# Patient Record
Sex: MALE | Race: WHITE | Employment: OTHER | URBAN - METROPOLITAN AREA
[De-identification: names, ages, dates, MRNs, and addresses within clinical notes are randomized per-mention and may not be internally consistent; named-entity substitution may affect disease eponyms.]

---

## 2019-04-17 ENCOUNTER — HOSPITAL ENCOUNTER (OUTPATIENT)
Age: 84
Discharge: HOME | End: 2019-04-19
Payer: MEDICARE

## 2019-04-17 DIAGNOSIS — E03.9 ACQUIRED HYPOTHYROIDISM: ICD-10-CM

## 2019-04-17 DIAGNOSIS — E11.9 TYPE 2 DIABETES MELLITUS WITHOUT COMPLICATION, WITHOUT LONG-TERM CURRENT USE OF INSULIN (HCC): ICD-10-CM

## 2019-04-17 PROBLEM — N40.0 BENIGN PROSTATIC HYPERPLASIA WITHOUT LOWER URINARY TRACT SYMPTOMS: Status: ACTIVE | Noted: 2019-04-17

## 2019-04-17 PROBLEM — E78.00 PURE HYPERCHOLESTEROLEMIA: Status: ACTIVE | Noted: 2019-04-17

## 2019-04-17 LAB
ALBUMIN SERPL-MCNC: 4.1 G/DL (ref 3.5–5.2)
ALP BLD-CCNC: 65 U/L (ref 40–129)
ALT SERPL-CCNC: 6 U/L (ref 0–40)
ANION GAP SERPL CALCULATED.3IONS-SCNC: 13 MMOL/L (ref 7–16)
AST SERPL-CCNC: 18 U/L (ref 0–39)
BASOPHILS ABSOLUTE: 0.08 E9/L (ref 0–0.2)
BASOPHILS RELATIVE PERCENT: 1.1 % (ref 0–2)
BILIRUB SERPL-MCNC: 0.6 MG/DL (ref 0–1.2)
BUN BLDV-MCNC: 15 MG/DL (ref 8–23)
CALCIUM SERPL-MCNC: 9.6 MG/DL (ref 8.6–10.2)
CHLORIDE BLD-SCNC: 106 MMOL/L (ref 98–107)
CHOLESTEROL, TOTAL: 128 MG/DL (ref 0–199)
CO2: 23 MMOL/L (ref 22–29)
CREAT SERPL-MCNC: 1.2 MG/DL (ref 0.7–1.2)
EOSINOPHILS ABSOLUTE: 0.21 E9/L (ref 0.05–0.5)
EOSINOPHILS RELATIVE PERCENT: 3 % (ref 0–6)
GFR AFRICAN AMERICAN: >60
GFR NON-AFRICAN AMERICAN: 58 ML/MIN/1.73
GLUCOSE BLD-MCNC: 114 MG/DL (ref 74–99)
HBA1C MFR BLD: 6.3 % (ref 4–5.6)
HCT VFR BLD CALC: 42.6 % (ref 37–54)
HDLC SERPL-MCNC: 44 MG/DL
HEMOGLOBIN: 13.8 G/DL (ref 12.5–16.5)
IMMATURE GRANULOCYTES #: 0.03 E9/L
IMMATURE GRANULOCYTES %: 0.4 % (ref 0–5)
LDL CHOLESTEROL CALCULATED: 68 MG/DL (ref 0–99)
LYMPHOCYTES ABSOLUTE: 2.54 E9/L (ref 1.5–4)
LYMPHOCYTES RELATIVE PERCENT: 35.8 % (ref 20–42)
MCH RBC QN AUTO: 29.6 PG (ref 26–35)
MCHC RBC AUTO-ENTMCNC: 32.4 % (ref 32–34.5)
MCV RBC AUTO: 91.4 FL (ref 80–99.9)
MONOCYTES ABSOLUTE: 0.57 E9/L (ref 0.1–0.95)
MONOCYTES RELATIVE PERCENT: 8 % (ref 2–12)
NEUTROPHILS ABSOLUTE: 3.67 E9/L (ref 1.8–7.3)
NEUTROPHILS RELATIVE PERCENT: 51.7 % (ref 43–80)
PDW BLD-RTO: 13.1 FL (ref 11.5–15)
PLATELET # BLD: 197 E9/L (ref 130–450)
PMV BLD AUTO: 11.1 FL (ref 7–12)
POTASSIUM SERPL-SCNC: 4.3 MMOL/L (ref 3.5–5)
RBC # BLD: 4.66 E12/L (ref 3.8–5.8)
SODIUM BLD-SCNC: 142 MMOL/L (ref 132–146)
TOTAL PROTEIN: 7 G/DL (ref 6.4–8.3)
TRIGL SERPL-MCNC: 79 MG/DL (ref 0–149)
TSH SERPL DL<=0.05 MIU/L-ACNC: 3.07 UIU/ML (ref 0.27–4.2)
VLDLC SERPL CALC-MCNC: 16 MG/DL
WBC # BLD: 7.1 E9/L (ref 4.5–11.5)

## 2019-04-17 PROCEDURE — 84443 ASSAY THYROID STIM HORMONE: CPT

## 2019-04-17 PROCEDURE — 80061 LIPID PANEL: CPT

## 2019-04-17 PROCEDURE — 80053 COMPREHEN METABOLIC PANEL: CPT

## 2019-04-17 PROCEDURE — 85025 COMPLETE CBC W/AUTO DIFF WBC: CPT

## 2019-04-17 PROCEDURE — 83036 HEMOGLOBIN GLYCOSYLATED A1C: CPT

## 2019-07-17 ENCOUNTER — HOSPITAL ENCOUNTER (OUTPATIENT)
Age: 84
Discharge: HOME | End: 2019-07-19
Payer: MEDICARE

## 2019-07-17 DIAGNOSIS — E11.9 TYPE 2 DIABETES MELLITUS WITHOUT COMPLICATION, WITHOUT LONG-TERM CURRENT USE OF INSULIN (HCC): ICD-10-CM

## 2019-07-17 DIAGNOSIS — E03.9 ACQUIRED HYPOTHYROIDISM: ICD-10-CM

## 2019-07-17 LAB
BASOPHILS ABSOLUTE: 0.05 E9/L (ref 0–0.2)
BASOPHILS RELATIVE PERCENT: 0.7 % (ref 0–2)
EOSINOPHILS ABSOLUTE: 0.11 E9/L (ref 0.05–0.5)
EOSINOPHILS RELATIVE PERCENT: 1.6 % (ref 0–6)
HCT VFR BLD CALC: 42.8 % (ref 37–54)
HEMOGLOBIN: 13.7 G/DL (ref 12.5–16.5)
IMMATURE GRANULOCYTES #: 0.02 E9/L
IMMATURE GRANULOCYTES %: 0.3 % (ref 0–5)
LYMPHOCYTES ABSOLUTE: 2.2 E9/L (ref 1.5–4)
LYMPHOCYTES RELATIVE PERCENT: 33 % (ref 20–42)
MCH RBC QN AUTO: 29.5 PG (ref 26–35)
MCHC RBC AUTO-ENTMCNC: 32 % (ref 32–34.5)
MCV RBC AUTO: 92.2 FL (ref 80–99.9)
MONOCYTES ABSOLUTE: 0.52 E9/L (ref 0.1–0.95)
MONOCYTES RELATIVE PERCENT: 7.8 % (ref 2–12)
NEUTROPHILS ABSOLUTE: 3.77 E9/L (ref 1.8–7.3)
NEUTROPHILS RELATIVE PERCENT: 56.6 % (ref 43–80)
PDW BLD-RTO: 13.3 FL (ref 11.5–15)
PLATELET # BLD: 163 E9/L (ref 130–450)
PMV BLD AUTO: 11.4 FL (ref 7–12)
RBC # BLD: 4.64 E12/L (ref 3.8–5.8)
WBC # BLD: 6.7 E9/L (ref 4.5–11.5)

## 2019-07-17 PROCEDURE — 82570 ASSAY OF URINE CREATININE: CPT

## 2019-07-17 PROCEDURE — 80061 LIPID PANEL: CPT

## 2019-07-17 PROCEDURE — 83036 HEMOGLOBIN GLYCOSYLATED A1C: CPT

## 2019-07-17 PROCEDURE — 82044 UR ALBUMIN SEMIQUANTITATIVE: CPT

## 2019-07-17 PROCEDURE — 80053 COMPREHEN METABOLIC PANEL: CPT

## 2019-07-17 PROCEDURE — 84443 ASSAY THYROID STIM HORMONE: CPT

## 2019-07-17 PROCEDURE — 85025 COMPLETE CBC W/AUTO DIFF WBC: CPT

## 2019-07-18 LAB
ALBUMIN SERPL-MCNC: 4.1 G/DL (ref 3.5–5.2)
ALP BLD-CCNC: 65 U/L (ref 40–129)
ALT SERPL-CCNC: 15 U/L (ref 0–40)
ANION GAP SERPL CALCULATED.3IONS-SCNC: 13 MMOL/L (ref 7–16)
AST SERPL-CCNC: 17 U/L (ref 0–39)
BILIRUB SERPL-MCNC: 0.5 MG/DL (ref 0–1.2)
BUN BLDV-MCNC: 15 MG/DL (ref 8–23)
CALCIUM SERPL-MCNC: 9.3 MG/DL (ref 8.6–10.2)
CHLORIDE BLD-SCNC: 106 MMOL/L (ref 98–107)
CHOLESTEROL, TOTAL: 132 MG/DL (ref 0–199)
CO2: 22 MMOL/L (ref 22–29)
CREAT SERPL-MCNC: 1.3 MG/DL (ref 0.7–1.2)
CREATININE URINE: 119 MG/DL (ref 40–278)
GFR AFRICAN AMERICAN: >60
GFR NON-AFRICAN AMERICAN: 52 ML/MIN/1.73
GLUCOSE BLD-MCNC: 122 MG/DL (ref 74–99)
HBA1C MFR BLD: 6.3 % (ref 4–5.6)
HDLC SERPL-MCNC: 46 MG/DL
LDL CHOLESTEROL CALCULATED: 71 MG/DL (ref 0–99)
MICROALBUMIN UR-MCNC: 84.6 MG/L
MICROALBUMIN/CREAT UR-RTO: 71.1 (ref 0–30)
POTASSIUM SERPL-SCNC: 3.8 MMOL/L (ref 3.5–5)
SODIUM BLD-SCNC: 141 MMOL/L (ref 132–146)
TOTAL PROTEIN: 7.1 G/DL (ref 6.4–8.3)
TRIGL SERPL-MCNC: 75 MG/DL (ref 0–149)
TSH SERPL DL<=0.05 MIU/L-ACNC: 3.08 UIU/ML (ref 0.27–4.2)
VLDLC SERPL CALC-MCNC: 15 MG/DL

## 2019-10-23 ENCOUNTER — HOSPITAL ENCOUNTER (OUTPATIENT)
Age: 84
Discharge: HOME | End: 2019-10-25
Payer: MEDICARE

## 2019-10-23 DIAGNOSIS — E03.9 ACQUIRED HYPOTHYROIDISM: ICD-10-CM

## 2019-10-23 DIAGNOSIS — E11.9 TYPE 2 DIABETES MELLITUS WITHOUT COMPLICATION, WITHOUT LONG-TERM CURRENT USE OF INSULIN (HCC): ICD-10-CM

## 2019-10-23 LAB
BASOPHILS ABSOLUTE: 0.08 E9/L (ref 0–0.2)
BASOPHILS RELATIVE PERCENT: 1.1 % (ref 0–2)
EOSINOPHILS ABSOLUTE: 0.2 E9/L (ref 0.05–0.5)
EOSINOPHILS RELATIVE PERCENT: 2.8 % (ref 0–6)
HCT VFR BLD CALC: 45.3 % (ref 37–54)
HEMOGLOBIN: 14.6 G/DL (ref 12.5–16.5)
IMMATURE GRANULOCYTES #: 0.02 E9/L
IMMATURE GRANULOCYTES %: 0.3 % (ref 0–5)
LYMPHOCYTES ABSOLUTE: 2.6 E9/L (ref 1.5–4)
LYMPHOCYTES RELATIVE PERCENT: 36.7 % (ref 20–42)
MCH RBC QN AUTO: 29.9 PG (ref 26–35)
MCHC RBC AUTO-ENTMCNC: 32.2 % (ref 32–34.5)
MCV RBC AUTO: 92.8 FL (ref 80–99.9)
MONOCYTES ABSOLUTE: 0.51 E9/L (ref 0.1–0.95)
MONOCYTES RELATIVE PERCENT: 7.2 % (ref 2–12)
NEUTROPHILS ABSOLUTE: 3.68 E9/L (ref 1.8–7.3)
NEUTROPHILS RELATIVE PERCENT: 51.9 % (ref 43–80)
PDW BLD-RTO: 13.7 FL (ref 11.5–15)
PLATELET # BLD: 193 E9/L (ref 130–450)
PMV BLD AUTO: 11.1 FL (ref 7–12)
RBC # BLD: 4.88 E12/L (ref 3.8–5.8)
WBC # BLD: 7.1 E9/L (ref 4.5–11.5)

## 2019-10-23 PROCEDURE — 80053 COMPREHEN METABOLIC PANEL: CPT

## 2019-10-23 PROCEDURE — 80061 LIPID PANEL: CPT

## 2019-10-23 PROCEDURE — 85025 COMPLETE CBC W/AUTO DIFF WBC: CPT

## 2019-10-23 PROCEDURE — 82570 ASSAY OF URINE CREATININE: CPT

## 2019-10-23 PROCEDURE — 82044 UR ALBUMIN SEMIQUANTITATIVE: CPT

## 2019-10-23 PROCEDURE — 83036 HEMOGLOBIN GLYCOSYLATED A1C: CPT

## 2019-10-23 PROCEDURE — 84443 ASSAY THYROID STIM HORMONE: CPT

## 2019-10-24 LAB
ALBUMIN SERPL-MCNC: 4.3 G/DL (ref 3.5–5.2)
ALP BLD-CCNC: 59 U/L (ref 40–129)
ALT SERPL-CCNC: 13 U/L (ref 0–40)
ANION GAP SERPL CALCULATED.3IONS-SCNC: 15 MMOL/L (ref 7–16)
AST SERPL-CCNC: 22 U/L (ref 0–39)
BILIRUB SERPL-MCNC: 0.6 MG/DL (ref 0–1.2)
BUN BLDV-MCNC: 15 MG/DL (ref 8–23)
CALCIUM SERPL-MCNC: 9.2 MG/DL (ref 8.6–10.2)
CHLORIDE BLD-SCNC: 106 MMOL/L (ref 98–107)
CHOLESTEROL, TOTAL: 146 MG/DL (ref 0–199)
CO2: 21 MMOL/L (ref 22–29)
CREAT SERPL-MCNC: 1.5 MG/DL (ref 0.7–1.2)
CREATININE URINE: 104 MG/DL (ref 40–278)
GFR AFRICAN AMERICAN: 54
GFR NON-AFRICAN AMERICAN: 44 ML/MIN/1.73
GLUCOSE BLD-MCNC: 116 MG/DL (ref 74–99)
HBA1C MFR BLD: 6.4 % (ref 4–5.6)
HDLC SERPL-MCNC: 47 MG/DL
LDL CHOLESTEROL CALCULATED: 83 MG/DL (ref 0–99)
MICROALBUMIN UR-MCNC: 58.7 MG/L
MICROALBUMIN/CREAT UR-RTO: 56.4 (ref 0–30)
POTASSIUM SERPL-SCNC: 4.4 MMOL/L (ref 3.5–5)
SODIUM BLD-SCNC: 142 MMOL/L (ref 132–146)
TOTAL PROTEIN: 7.3 G/DL (ref 6.4–8.3)
TRIGL SERPL-MCNC: 79 MG/DL (ref 0–149)
TSH SERPL DL<=0.05 MIU/L-ACNC: 5.7 UIU/ML (ref 0.27–4.2)
VLDLC SERPL CALC-MCNC: 16 MG/DL

## 2020-06-08 ENCOUNTER — HOSPITAL ENCOUNTER (OUTPATIENT)
Age: 85
Discharge: HOME | End: 2020-06-10
Payer: MEDICARE

## 2020-06-08 PROCEDURE — 80061 LIPID PANEL: CPT

## 2020-06-08 PROCEDURE — 82044 UR ALBUMIN SEMIQUANTITATIVE: CPT

## 2020-06-08 PROCEDURE — 82570 ASSAY OF URINE CREATININE: CPT

## 2020-06-08 PROCEDURE — 83036 HEMOGLOBIN GLYCOSYLATED A1C: CPT

## 2020-06-08 PROCEDURE — 80053 COMPREHEN METABOLIC PANEL: CPT

## 2020-06-08 PROCEDURE — 85025 COMPLETE CBC W/AUTO DIFF WBC: CPT

## 2020-06-09 LAB
ALBUMIN SERPL-MCNC: 4.3 G/DL (ref 3.5–5.2)
ALP BLD-CCNC: 54 U/L (ref 40–129)
ALT SERPL-CCNC: 19 U/L (ref 0–40)
ANION GAP SERPL CALCULATED.3IONS-SCNC: 13 MMOL/L (ref 7–16)
AST SERPL-CCNC: 25 U/L (ref 0–39)
BASOPHILS ABSOLUTE: 0.06 E9/L (ref 0–0.2)
BASOPHILS RELATIVE PERCENT: 0.8 % (ref 0–2)
BILIRUB SERPL-MCNC: 0.4 MG/DL (ref 0–1.2)
BUN BLDV-MCNC: 18 MG/DL (ref 8–23)
CALCIUM SERPL-MCNC: 9.7 MG/DL (ref 8.6–10.2)
CHLORIDE BLD-SCNC: 104 MMOL/L (ref 98–107)
CHOLESTEROL, TOTAL: 130 MG/DL (ref 0–199)
CO2: 22 MMOL/L (ref 22–29)
CREAT SERPL-MCNC: 1.5 MG/DL (ref 0.7–1.2)
CREATININE URINE: 103 MG/DL (ref 40–278)
EOSINOPHILS ABSOLUTE: 0.08 E9/L (ref 0.05–0.5)
EOSINOPHILS RELATIVE PERCENT: 1.1 % (ref 0–6)
GFR AFRICAN AMERICAN: 54
GFR NON-AFRICAN AMERICAN: 44 ML/MIN/1.73
GLUCOSE BLD-MCNC: 97 MG/DL (ref 74–99)
HBA1C MFR BLD: 6.5 % (ref 4–5.6)
HCT VFR BLD CALC: 44.8 % (ref 37–54)
HDLC SERPL-MCNC: 49 MG/DL
HEMOGLOBIN: 13.9 G/DL (ref 12.5–16.5)
IMMATURE GRANULOCYTES #: 0.03 E9/L
IMMATURE GRANULOCYTES %: 0.4 % (ref 0–5)
LDL CHOLESTEROL CALCULATED: 68 MG/DL (ref 0–99)
LYMPHOCYTES ABSOLUTE: 2.77 E9/L (ref 1.5–4)
LYMPHOCYTES RELATIVE PERCENT: 38 % (ref 20–42)
MCH RBC QN AUTO: 30 PG (ref 26–35)
MCHC RBC AUTO-ENTMCNC: 31 % (ref 32–34.5)
MCV RBC AUTO: 96.6 FL (ref 80–99.9)
MICROALBUMIN UR-MCNC: 81.9 MG/L
MICROALBUMIN/CREAT UR-RTO: 79.5 (ref 0–30)
MONOCYTES ABSOLUTE: 0.55 E9/L (ref 0.1–0.95)
MONOCYTES RELATIVE PERCENT: 7.5 % (ref 2–12)
NEUTROPHILS ABSOLUTE: 3.8 E9/L (ref 1.8–7.3)
NEUTROPHILS RELATIVE PERCENT: 52.2 % (ref 43–80)
PDW BLD-RTO: 13.3 FL (ref 11.5–15)
PLATELET # BLD: 179 E9/L (ref 130–450)
PMV BLD AUTO: 11.3 FL (ref 7–12)
POTASSIUM SERPL-SCNC: 4.5 MMOL/L (ref 3.5–5)
RBC # BLD: 4.64 E12/L (ref 3.8–5.8)
SODIUM BLD-SCNC: 139 MMOL/L (ref 132–146)
TOTAL PROTEIN: 7.3 G/DL (ref 6.4–8.3)
TRIGL SERPL-MCNC: 66 MG/DL (ref 0–149)
VLDLC SERPL CALC-MCNC: 13 MG/DL
WBC # BLD: 7.3 E9/L (ref 4.5–11.5)

## 2022-05-16 ENCOUNTER — HOSPITAL ENCOUNTER (INPATIENT)
Age: 87
LOS: 2 days | Discharge: HOME HEALTH | DRG: 069 | End: 2022-05-20
Attending: EMERGENCY MEDICINE | Admitting: INTERNAL MEDICINE
Payer: MEDICARE

## 2022-05-16 ENCOUNTER — APPOINTMENT (OUTPATIENT)
Dept: GENERAL RADIOLOGY | Age: 87
DRG: 069 | End: 2022-05-16
Payer: MEDICARE

## 2022-05-16 ENCOUNTER — APPOINTMENT (OUTPATIENT)
Dept: CT IMAGING | Age: 87
DRG: 069 | End: 2022-05-16
Payer: MEDICARE

## 2022-05-16 DIAGNOSIS — R42 DIZZINESS: Primary | ICD-10-CM

## 2022-05-16 DIAGNOSIS — G45.9 TIA (TRANSIENT ISCHEMIC ATTACK): ICD-10-CM

## 2022-05-16 LAB
ALBUMIN SERPL-MCNC: 4.1 G/DL (ref 3.5–5.2)
ALP BLD-CCNC: 92 U/L (ref 40–129)
ALT SERPL-CCNC: 20 U/L (ref 0–40)
ANION GAP SERPL CALCULATED.3IONS-SCNC: 12 MMOL/L (ref 7–16)
AST SERPL-CCNC: 25 U/L (ref 0–39)
BACTERIA: ABNORMAL /HPF
BASOPHILS ABSOLUTE: 0.04 E9/L (ref 0–0.2)
BASOPHILS RELATIVE PERCENT: 0.6 % (ref 0–2)
BILIRUB SERPL-MCNC: 0.5 MG/DL (ref 0–1.2)
BILIRUBIN URINE: NEGATIVE
BLOOD, URINE: ABNORMAL
BUN BLDV-MCNC: 11 MG/DL (ref 6–23)
CALCIUM SERPL-MCNC: 8.7 MG/DL (ref 8.6–10.2)
CHLORIDE BLD-SCNC: 108 MMOL/L (ref 98–107)
CLARITY: CLEAR
CO2: 18 MMOL/L (ref 22–29)
COLOR: YELLOW
CREAT SERPL-MCNC: 1.4 MG/DL (ref 0.7–1.2)
EOSINOPHILS ABSOLUTE: 0.02 E9/L (ref 0.05–0.5)
EOSINOPHILS RELATIVE PERCENT: 0.3 % (ref 0–6)
GFR AFRICAN AMERICAN: 58
GFR NON-AFRICAN AMERICAN: 48 ML/MIN/1.73
GLUCOSE BLD-MCNC: 162 MG/DL (ref 74–99)
GLUCOSE URINE: NEGATIVE MG/DL
HCT VFR BLD CALC: 43.9 % (ref 37–54)
HEMOGLOBIN: 15 G/DL (ref 12.5–16.5)
IMMATURE GRANULOCYTES #: 0.03 E9/L
IMMATURE GRANULOCYTES %: 0.4 % (ref 0–5)
KETONES, URINE: NEGATIVE MG/DL
LEUKOCYTE ESTERASE, URINE: NEGATIVE
LYMPHOCYTES ABSOLUTE: 1.2 E9/L (ref 1.5–4)
LYMPHOCYTES RELATIVE PERCENT: 16.9 % (ref 20–42)
MCH RBC QN AUTO: 30.5 PG (ref 26–35)
MCHC RBC AUTO-ENTMCNC: 34.2 % (ref 32–34.5)
MCV RBC AUTO: 89.2 FL (ref 80–99.9)
MONOCYTES ABSOLUTE: 0.69 E9/L (ref 0.1–0.95)
MONOCYTES RELATIVE PERCENT: 9.7 % (ref 2–12)
NEUTROPHILS ABSOLUTE: 5.11 E9/L (ref 1.8–7.3)
NEUTROPHILS RELATIVE PERCENT: 72.1 % (ref 43–80)
NITRITE, URINE: NEGATIVE
PDW BLD-RTO: 13.2 FL (ref 11.5–15)
PH UA: 5.5 (ref 5–9)
PLATELET # BLD: 153 E9/L (ref 130–450)
PMV BLD AUTO: 10.5 FL (ref 7–12)
POTASSIUM SERPL-SCNC: 3.7 MMOL/L (ref 3.5–5)
PROTEIN UA: ABNORMAL MG/DL
RBC # BLD: 4.92 E12/L (ref 3.8–5.8)
RBC UA: ABNORMAL /HPF (ref 0–2)
SODIUM BLD-SCNC: 138 MMOL/L (ref 132–146)
SPECIFIC GRAVITY UA: >=1.03 (ref 1–1.03)
TOTAL PROTEIN: 7.1 G/DL (ref 6.4–8.3)
TROPONIN, HIGH SENSITIVITY: 15 NG/L (ref 0–11)
UROBILINOGEN, URINE: 0.2 E.U./DL
WBC # BLD: 7.1 E9/L (ref 4.5–11.5)
WBC UA: ABNORMAL /HPF (ref 0–5)

## 2022-05-16 PROCEDURE — 36415 COLL VENOUS BLD VENIPUNCTURE: CPT

## 2022-05-16 PROCEDURE — 85025 COMPLETE CBC W/AUTO DIFF WBC: CPT

## 2022-05-16 PROCEDURE — 99285 EMERGENCY DEPT VISIT HI MDM: CPT

## 2022-05-16 PROCEDURE — 71045 X-RAY EXAM CHEST 1 VIEW: CPT

## 2022-05-16 PROCEDURE — 84484 ASSAY OF TROPONIN QUANT: CPT

## 2022-05-16 PROCEDURE — 81001 URINALYSIS AUTO W/SCOPE: CPT

## 2022-05-16 PROCEDURE — 80053 COMPREHEN METABOLIC PANEL: CPT

## 2022-05-16 PROCEDURE — 93005 ELECTROCARDIOGRAM TRACING: CPT | Performed by: EMERGENCY MEDICINE

## 2022-05-16 PROCEDURE — 70450 CT HEAD/BRAIN W/O DYE: CPT

## 2022-05-16 ASSESSMENT — PAIN - FUNCTIONAL ASSESSMENT: PAIN_FUNCTIONAL_ASSESSMENT: 0-10

## 2022-05-17 ENCOUNTER — APPOINTMENT (OUTPATIENT)
Dept: GENERAL RADIOLOGY | Age: 87
DRG: 069 | End: 2022-05-17
Payer: MEDICARE

## 2022-05-17 PROBLEM — G45.9 TIA (TRANSIENT ISCHEMIC ATTACK): Status: ACTIVE | Noted: 2022-05-17

## 2022-05-17 LAB
EKG ATRIAL RATE: 93 BPM
EKG P AXIS: 26 DEGREES
EKG P-R INTERVAL: 192 MS
EKG Q-T INTERVAL: 374 MS
EKG QRS DURATION: 100 MS
EKG QTC CALCULATION (BAZETT): 465 MS
EKG R AXIS: -56 DEGREES
EKG T AXIS: 18 DEGREES
EKG VENTRICULAR RATE: 93 BPM
TSH SERPL DL<=0.05 MIU/L-ACNC: 1.18 UIU/ML (ref 0.27–4.2)

## 2022-05-17 PROCEDURE — 92611 MOTION FLUOROSCOPY/SWALLOW: CPT

## 2022-05-17 PROCEDURE — 36415 COLL VENOUS BLD VENIPUNCTURE: CPT

## 2022-05-17 PROCEDURE — 92526 ORAL FUNCTION THERAPY: CPT

## 2022-05-17 PROCEDURE — 84443 ASSAY THYROID STIM HORMONE: CPT

## 2022-05-17 PROCEDURE — 97530 THERAPEUTIC ACTIVITIES: CPT

## 2022-05-17 PROCEDURE — 96374 THER/PROPH/DIAG INJ IV PUSH: CPT

## 2022-05-17 PROCEDURE — G0378 HOSPITAL OBSERVATION PER HR: HCPCS

## 2022-05-17 PROCEDURE — 6360000002 HC RX W HCPCS: Performed by: EMERGENCY MEDICINE

## 2022-05-17 PROCEDURE — 2500000003 HC RX 250 WO HCPCS: Performed by: RADIOLOGY

## 2022-05-17 PROCEDURE — 74230 X-RAY XM SWLNG FUNCJ C+: CPT

## 2022-05-17 PROCEDURE — 97165 OT EVAL LOW COMPLEX 30 MIN: CPT

## 2022-05-17 PROCEDURE — 2580000003 HC RX 258: Performed by: INTERNAL MEDICINE

## 2022-05-17 PROCEDURE — 99220 PR INITIAL OBSERVATION CARE/DAY 70 MINUTES: CPT | Performed by: INTERNAL MEDICINE

## 2022-05-17 PROCEDURE — 6370000000 HC RX 637 (ALT 250 FOR IP): Performed by: EMERGENCY MEDICINE

## 2022-05-17 RX ORDER — GLIPIZIDE 5 MG/1
5 TABLET ORAL
Status: DISCONTINUED | OUTPATIENT
Start: 2022-05-17 | End: 2022-05-20 | Stop reason: HOSPADM

## 2022-05-17 RX ORDER — DEXTROSE AND SODIUM CHLORIDE 5; .9 G/100ML; G/100ML
INJECTION, SOLUTION INTRAVENOUS CONTINUOUS
Status: DISCONTINUED | OUTPATIENT
Start: 2022-05-17 | End: 2022-05-20 | Stop reason: HOSPADM

## 2022-05-17 RX ORDER — MECLIZINE HCL 12.5 MG/1
12.5 TABLET ORAL 3 TIMES DAILY PRN
Status: DISCONTINUED | OUTPATIENT
Start: 2022-05-17 | End: 2022-05-20 | Stop reason: HOSPADM

## 2022-05-17 RX ORDER — ASPIRIN 81 MG/1
81 TABLET, CHEWABLE ORAL DAILY
Status: DISCONTINUED | OUTPATIENT
Start: 2022-05-17 | End: 2022-05-20 | Stop reason: HOSPADM

## 2022-05-17 RX ORDER — METOPROLOL SUCCINATE 25 MG/1
12.5 TABLET, EXTENDED RELEASE ORAL DAILY
Status: DISCONTINUED | OUTPATIENT
Start: 2022-05-17 | End: 2022-05-20 | Stop reason: HOSPADM

## 2022-05-17 RX ORDER — LANOLIN ALCOHOL/MO/W.PET/CERES
1000 CREAM (GRAM) TOPICAL DAILY
Status: DISCONTINUED | OUTPATIENT
Start: 2022-05-17 | End: 2022-05-20 | Stop reason: HOSPADM

## 2022-05-17 RX ORDER — MECLIZINE HCL 12.5 MG/1
25 TABLET ORAL ONCE
Status: COMPLETED | OUTPATIENT
Start: 2022-05-17 | End: 2022-05-17

## 2022-05-17 RX ORDER — ONDANSETRON 2 MG/ML
4 INJECTION INTRAMUSCULAR; INTRAVENOUS EVERY 6 HOURS PRN
Status: DISCONTINUED | OUTPATIENT
Start: 2022-05-17 | End: 2022-05-20 | Stop reason: HOSPADM

## 2022-05-17 RX ORDER — LEVOTHYROXINE SODIUM 88 UG/1
88 TABLET ORAL DAILY
Status: DISCONTINUED | OUTPATIENT
Start: 2022-05-17 | End: 2022-05-20 | Stop reason: HOSPADM

## 2022-05-17 RX ORDER — VITAMIN B COMPLEX
1000 TABLET ORAL DAILY
Status: DISCONTINUED | OUTPATIENT
Start: 2022-05-17 | End: 2022-05-20 | Stop reason: HOSPADM

## 2022-05-17 RX ADMIN — DEXTROSE AND SODIUM CHLORIDE: 5; 900 INJECTION, SOLUTION INTRAVENOUS at 12:16

## 2022-05-17 RX ADMIN — BARIUM SULFATE 15 ML: 400 PASTE ORAL at 13:46

## 2022-05-17 RX ADMIN — MECLIZINE 25 MG: 12.5 TABLET ORAL at 00:43

## 2022-05-17 RX ADMIN — BARIUM SULFATE 15 ML: 400 SUSPENSION ORAL at 13:46

## 2022-05-17 RX ADMIN — ONDANSETRON 4 MG: 2 INJECTION INTRAMUSCULAR; INTRAVENOUS at 05:38

## 2022-05-17 ASSESSMENT — PAIN SCALES - GENERAL
PAINLEVEL_OUTOF10: 0
PAINLEVEL_OUTOF10: 0

## 2022-05-17 NOTE — PROGRESS NOTES
SPEECH/LANGUAGE PATHOLOGY  VIDEOFLUOROSCOPIC STUDY OF SWALLOWING (MBS)   and PLAN OF CARE    PATIENT NAME:  Nathaly Lindsey  (male)     MRN:  [id number]    :  1934  (80 y.o.)  STATUS:  Inpatient: Room 6501/6501-A    TODAY'S DATE:  2022  REFERRING PROVIDER:   Dr. Sofi Luna. HenrikChelo   SPECIFIC PROVIDER ORDER: FL modified barium swallow with video  Date of order:  22   REASON FOR REFERRAL: dysphagia   EVALUATING THERAPIST: Analy Cunha SLP      RESULTS:      DYSPHAGIA DIAGNOSIS:  severe  oropharyngeal phase dysphagia     DIET RECOMMENDATIONS:  NPO      FEEDING RECOMMENDATIONS:    Assistance level:  Not applicable     Compensatory strategies recommended: Not applicable     Discussed recommendations with nursing and/or faxed report to referring provider: No secondary to no diet/liquid change recommended       SPEECH THERAPY  PLAN OF CARE   The dysphagia POC is established based on physician order and dysphagia diagnosis    Skilled SLP intervention for dysphagia management up to 5x per week until goals met, pt plateaus in function and/or discharged from hospital      Conditions Requiring Skilled Therapeutic Intervention for dysphagia:    Oral motor strength/coordination impairment  Reduced pharyngeal clearing of the bolus  Reduced laryngeal closure resulting in aspiration   Swallow triggered when bolus head at level of pyriform sinus increasing risk of aspiration    SPECIFIC DYSPHAGIA INTERVENTIONS TO INCLUDE:     Therapeutic exercises    Specific instructions for next treatment:  initiate instruction of therapeutic exercises   Treatment Goals:    Short Term Goals:  Pt will complete oral motor strength/ coordination exercises to improve bolus prep/ control and mastication with  moderate verbal prompts .   Pt will complete BOTR strength/ ROM exercises to reduce pharyngeal residuals and improve epiglottic inversion moderate verbal prompts  Pt will complete laryngeal strength/ ROM therapeutic exercises to DURING the swallow for nectar consistency liquid due to  inadequate laryngeal closure . Aspiration was mild-moderate and occurred consistently . an absent cough/throat clear was noted  Aspiration occurred AFTER the swallow for nectar consistency liquid and pureed foods due to pharyngeal residuals . Aspiration was moderate and occurred inconsistently . an absent cough/throat clear was noted    PENETRATION-ASPIRATION SCALE (PAS):  THIN item not administered  MILDLY THICK 8 = Material enters the airway, passes below the vocal folds, and no effort is made to eject   MODERATELY THICK item not administered  PUREE 8 = Material enters the airway, passes below the vocal folds, and no effort is made to eject   HARD SOLID item not administered       COMPENSATORY STRATEGIES    Compensatory strategies that were not beneficial included Multiple swallow, Chin tuck and Throat clear      STRUCTURAL/FUNCTIONAL ANOMALIES   No structural/functional anomalies were noted    CERVICAL ESOPHAGEAL STAGE :     The cervical esophagus appeared adequate          ___________    Cognition:   Confusion noted    Oral Peripheral Examination   Generalized oral weakness    Current Respiratory Status   room air     Parameters of Speech Production  Respiration:  Adequate for speech production  Quality:   Strained  Intensity: Quiet    Pain: No pain reported. EDUCATION:   The Speech Language Pathologist (SLP) completed education regarding results of evaluation and that intervention is warranted at this time. Learner: Patient  Education: Reviewed results and recommendations of this evaluation  Evaluation of Education:  Needs further instruction    This plan may be re-evaluated and revised as warranted.         Evaluation Time includes thorough review of current medical information, gathering information on past medical history/social history and prior level of function, completion of standardized testing/informal observation of tasks, assessment of data and education on plan of care and goals. [x]The admitting diagnosis and active problem list, have been reviewed prior to initiation of this evaluation.     CPT Code: 89224  dysphagia study    ACTIVE PROBLEM LIST:   Patient Active Problem List   Diagnosis    Type 2 diabetes mellitus without complication, without long-term current use of insulin ([facility] [address]    Acquired hypothyroidism    Pure hypercholesterolemia    Benign prostatic hyperplasia without lower urinary tract symptoms    TIA (transient ischemic attack)

## 2022-05-17 NOTE — PROGRESS NOTES
Occupational Therapy  OCCUPATIONAL THERAPY INITIAL EVALUATION    [address] [address] [address]      Date:2022                                                Patient Name: Melissa Adam  MRN: [id number]  : 1934  Room: [place] [id number]    Referring Provider: Teresa Thrasher MD  Specific Provider Orders/Date: OT eval and treat 22    Diagnosis: TIA (transient ischemic attack) [G45.9]  Dizziness [R42]   Pt admitted to hospital on 22 for dizziness, weakness     Pertinent Medical History:  has a past medical history of Diabetes mellitus ([facility] [address] Dizziness, and Thyroid disease.        Precautions:  Fall Risk, cognition, bed alarm, TAPS    Assessment of current deficits    [x] Functional mobility  [x]ADLs  [x] Strength               [x]Cognition    [x] Functional transfers   [x] IADLs         [x] Safety Awareness   [x]Endurance    [] Fine Coordination              [x] Balance      [] Vision/perception   []Sensation     []Gross Motor Coordination  [] ROM  [] Delirium                   [] Motor Control     OT PLAN OF CARE   OT POC based on physician orders, patient diagnosis and results of clinical assessment    Frequency/Duration 1-3 days/wk for 2 weeks PRN   Specific OT Treatment Interventions to include:   * Instruction/training on adapted ADL techniques and AE recommendations to increase functional independence within precautions       * Training on energy conservation strategies, correct breathing pattern and techniques to improve independence/tolerance for self-care routine  * Functional transfer/mobility training/DME recommendations for increased independence, safety, and fall prevention  * Patient/Family education to increase follow through with safety techniques and functional independence  * Recommendation of environmental modifications for increased safety with functional transfers/mobility and ADLs  * Cognitive retraining/development of therapeutic activities to improve problem solving, judgement, memory, and attention for increased safety/participation in ADL/IADL tasks  * Therapeutic exercise to improve motor endurance, ROM, and functional strength for ADLs/functional transfers  * Therapeutic activities to facilitate/challenge dynamic balance, stand tolerance for increased safety and independence with ADLs  * Therapeutic activities to facilitate gross/fine motor skills for increased independence with ADLs      OTMRS   Modified [place] Scale (MRS)  Score     Description  0             No symptoms  1             No significant disability despite symptoms  2             Slight disability; able to look after own affairs  3             Moderate disability; able to ambulate without assist/ requires assist with ADLs  4             Moderate/Severe disability;requires assist to ambulate/assist with ADLs  5             Severe disability;bedridden/incontinent   6               Score:  4    Recommended Adaptive Equipment: TBD     Home Living: Pt lives alone in 2 floor home. 0 TG  Bath and bedroom on 2nd floor - flight of stairs    Bathroom setup: walk in shower (2nd floor)    Equipment owned: n/a    Prior Level of Function: independent with ADLs , independent with IADLs; ambulated w/o AD   Driving: yes    Pain Level: Pt c/o no pain this session     Cognition: A&O: 3/4; Follows 1 step directions  Lethargic. Delayed processing speed.  Flat affect   Memory:  fair    Sequencing:  fair    Problem solving:  poor   Judgement/safety:  poor     Functional Assessment:  AM-PAC Daily Activity Raw Score:    Initial Eval Status  Date: 22 Treatment Status  Date: STGs = LTGs  Time frame: 10-14 days   Feeding NPO  -   Grooming Moderate Assist   Washed hands and face  Supervision    UB Dressing Maximal Assist   Minimal Assist    LB Dressing Dependent   Moderate Assist    Bathing Dependent Minimal Assist    Toileting Dependent   Moderate Assist    Bed Mobility  Supine to sit: Maximal Assist   Sit to supine: Dependent   Supine to sit: Minimal Assist   Sit to supine: Minimal Assist    Functional Transfers Maximal Assist   Minimal Assist    Functional Mobility NT  Deferred d/t poor standing tolerance  Minimal Assist    Balance Sitting:     Static:  Mod A>Max A    Dynamic:Max A  Increased fatigue noted as progressed - heavy posterior lean  Standing: Max A w/ w/w     Activity Tolerance Poor+  Fair+   Visual/  Perceptual Glasses: yes  No complaints verbalized. Unable to accurately assess d/t lethargy                  Hand Dominance R   AROM (PROM) Strength Additional Info:    RUE  WFL 3-/5 good  and wfl FMC/dexterity noted during ADL tasks       LUE WFL 3-/5 good  and wfl FMC/dexterity noted during ADL tasks       Hearing: WFL   Sensation:  No c/o numbness or tingling   Tone: WFL   Edema: none noted    Comments: Upon arrival patient lying in bed. Therapist educated pt on role of OT. At end of session, patient lying in bed (bed alarm on) with call light and phone within reach, all lines and tubes intact. Overall patient demonstrated decreased independence and safety during completion of ADL/functional transfer/mobility tasks. Pt would benefit from continued skilled OT to increase safety and independence with completion of ADL/IADL tasks for functional independence and quality of life. Treatment: OT treatment provided this date includes: Facilitation of bed mobility (education/cues for body mechanics, sequencing and safety), unsupported sitting balance (addressing posture, weight shifting, dynamic reaching to prep for ADL's), functional transfers (w/ education/cues for safety/hand placement, sequencing, attention) and standing tolerance tasks w/ w/w (addressing posture, balance and activity tolerance impacting ADLs and functional activity).   Therapist facilitated self-care retraining: DAVID self-care tasks (gown) and seated grooming tasks while educating/cuing pt on modified techniques, posture, safety and energy conservation techniques. Skilled monitoring of HR, O2 sats and pts response to treatment. Pt on room air. O2 sat=WFL. EN=207/70 seated EOB. No c/o dizziness during session. RN notified/aware    Rehab Potential: Good for established goals     Patient / Family Goal: not stated      Patient and/or family were instructed on functional diagnosis, prognosis/goals and OT plan of care. Demonstrated poor understanding. Eval Complexity: Low    Time In: 10:54  Time Out: 11:19  Total Treatment Time: 10 minutes    Min Units   OT Eval Low 97165  X  1   OT Eval Medium 63056      OT Eval High 85485      OT Re-Eval [id number]       Therapeutic Ex 79121       Therapeutic Activities 13168  10  1   ADL/Self Care 58715       Orthotic Management 49693       Manual 29713     Neuro Re-Ed 65195       Non-Billable Time          Evaluation Time additionally includes thorough review of current medical information, gathering information on past medical history/social history and prior level of function, interpretation of standardized testing/informal observation of tasks, assessment of data and development of plan of care and goals.         LENORA AdairR/L #[id number]

## 2022-05-17 NOTE — PROGRESS NOTES
Patient given water to drink and immediately started coughing. Worrisome for aspiration. Pt placed NPO and speech consult placed.

## 2022-05-17 NOTE — ED PROVIDER NOTES
HPI:  5/16/22, Time: 9:38 PM EDT         Renetta Hernandez is a 80 y.o. male presenting to the ED for dizzy and weak, beginning since 7:30 AM ago. The complaint has been persistent, moderate in severity, and worsened by nothing. Patient reports feeling dizzy having nausea and vomited once prior arrival.  Patient reporting no chest pain no difficulty breathing or abdominal pain. Patient reportedly was slurring his speech about an hour ago patient currently not slurring speech. Patient reporting no upper or lower extremity paralysis there is no facial droop. Patient reporting no headache or visual changes. Patient reporting no cough or fever. Patient does live alone at home. Per report cousin noted that he was slurring his speech. ROS:   Pertinent positives and negatives are stated within HPI, all other systems reviewed and are negative.  --------------------------------------------- PAST HISTORY ---------------------------------------------  Past Medical History:  has a past medical history of Diabetes mellitus ([facility] [address] Dizziness, and Thyroid disease. Past Surgical History:  has a past surgical history that includes Cholecystectomy; Cataract removal; Prostate surgery; and Colonoscopy. Social History:  reports that he has never smoked. He has never used smokeless tobacco. He reports that he does not drink alcohol and does not use drugs. Family History: family history includes Diabetes in his mother. The patients home medications have been reviewed. Allergies: Patient has no known allergies.     ---------------------------------------------------PHYSICAL EXAM--------------------------------------    Constitutional/General: Alert and oriented x3, well appearing, non toxic in NAD  Head: Normocephalic and atraumatic  Eyes: PERRL, noted horizontal nystagmus  Mouth: Oropharynx clear, handling secretions, no trismus  Neck: Supple, full ROM, non tender to palpation in the midline, no stridor, no crepitus, no meningeal signs  Pulmonary: Lungs clear to auscultation bilaterally, no wheezes, rales, or rhonchi. Not in respiratory distress  Cardiovascular:  Regular rate. Regular rhythm. No murmurs, gallops, or rubs. 2+ distal pulses  Chest: no chest wall tenderness  Abdomen: Soft. Non tender. Non distended. +BS. No rebound, guarding, or rigidity. No pulsatile masses appreciated. Musculoskeletal: Moves all extremities x 4. Warm and well perfused, no clubbing, cyanosis, or edema. Capillary refill <3 seconds  Skin: warm and dry. No rashes. Neurologic: GCS 15, CN 2-12 grossly intact, no focal deficits, symmetric strength 5/5 in the upper and lower extremities bilaterally  Psych: Normal Affect  NIH Stroke Scale at time of initial evaluation:  1A: Level of Consciousness 0 - alert; keenly responsive   1B: Ask Month and Age 0 - answers both questions correctly   1C:  Tell Patient To Open and Close Eyes, then Hand  Squeeze 0 - performs both tasks correctly   2: Test Horizontal Extraocular Movements 0 - normal   3: Test Visual Fields 0 - no visual loss   4: Test Facial Palsy 0 - normal symmetric movement   5A: Test Left Arm Motor Drift 0 - no drift, limb holds 90 (or 45) degrees for full 10 seconds   5B: Test Right Arm Motor Drift 0 - no drift, limb holds 90 (or 45) degrees for full 10 seconds   6A: Test Left Leg Motor Drift 0 - no drift; leg holds 30 degree position for full 5 seconds   6B: Test Right Leg Motor Drift 0 - no drift; leg holds 30 degree position for full 5 seconds   7: Test Limb Ataxia   (FNF/Heel-Shin) 0 - absent   8: Test Sensation 0 - normal; no sensory loss   9: Test Language/Aphasia 0 - no aphasia, normal   10: Test Dysarthria 0 - normal   11: Test Extinction/Inattention 0 - no abnormality   Total 0           -------------------------------------------------- RESULTS -------------------------------------------------  I have personally reviewed all laboratory and imaging results for this patient. Results are listed below.      LABS:  Results for orders placed or performed during the hospital encounter of 05/16/22   CBC with Auto Differential   Result Value Ref Range    WBC 7.1 4.5 - 11.5 E9/L    RBC 4.92 3.80 - 5.80 E12/L    Hemoglobin 15.0 12.5 - 16.5 g/dL    Hematocrit 43.9 37.0 - 54.0 %    MCV 89.2 80.0 - 99.9 fL    MCH 30.5 26.0 - 35.0 pg    MCHC 34.2 32.0 - 34.5 %    RDW 13.2 11.5 - 15.0 fL    Platelets 258 826 - 278 E9/L    MPV 10.5 7.0 - 12.0 fL    Neutrophils % 72.1 43.0 - 80.0 %    Immature Granulocytes % 0.4 0.0 - 5.0 %    Lymphocytes % 16.9 (L) 20.0 - 42.0 %    Monocytes % 9.7 2.0 - 12.0 %    Eosinophils % 0.3 0.0 - 6.0 %    Basophils % 0.6 0.0 - 2.0 %    Neutrophils Absolute 5.11 1.80 - 7.30 E9/L    Immature Granulocytes # 0.03 E9/L    Lymphocytes Absolute 1.20 (L) 1.50 - 4.00 E9/L    Monocytes Absolute 0.69 0.10 - 0.95 E9/L    Eosinophils Absolute 0.02 (L) 0.05 - 0.50 E9/L    Basophils Absolute 0.04 0.00 - 0.20 E9/L   Comprehensive Metabolic Panel   Result Value Ref Range    Sodium 138 132 - 146 mmol/L    Potassium 3.7 3.5 - 5.0 mmol/L    Chloride 108 (H) 98 - 107 mmol/L    CO2 18 (L) 22 - 29 mmol/L    Anion Gap 12 7 - 16 mmol/L    Glucose 162 (H) 74 - 99 mg/dL    BUN 11 6 - 23 mg/dL    CREATININE 1.4 (H) 0.7 - 1.2 mg/dL    GFR Non-African American 48 >=60 mL/min/1.73    GFR African American 58     Calcium 8.7 8.6 - 10.2 mg/dL    Total Protein 7.1 6.4 - 8.3 g/dL    Albumin 4.1 3.5 - 5.2 g/dL    Total Bilirubin 0.5 0.0 - 1.2 mg/dL    Alkaline Phosphatase 92 40 - 129 U/L    ALT 20 0 - 40 U/L    AST 25 0 - 39 U/L   Urinalysis   Result Value Ref Range    Color, UA Yellow Straw/Yellow    Clarity, UA Clear Clear    Glucose, Ur Negative Negative mg/dL    Bilirubin Urine Negative Negative    Ketones, Urine Negative Negative mg/dL    Specific Gravity, UA >=1.030 1.005 - 1.030    Blood, Urine MODERATE (A) Negative    pH, UA 5.5 5.0 - 9.0    Protein, UA TRACE Negative mg/dL    Urobilinogen, Urine 0. 2 <2.0 E.U./dL    Nitrite, Urine Negative Negative    Leukocyte Esterase, Urine Negative Negative   Microscopic Urinalysis   Result Value Ref Range    WBC, UA NONE 0 - 5 /HPF    RBC, UA 10-20 (A) 0 - 2 /HPF    Bacteria, UA RARE (A) None Seen /HPF   Troponin   Result Value Ref Range    Troponin, High Sensitivity 15 (H) 0 - 11 ng/L   EKG 12 Lead   Result Value Ref Range    Ventricular Rate 93 BPM    Atrial Rate 93 BPM    P-R Interval 192 ms    QRS Duration 100 ms    Q-T Interval 374 ms    QTc Calculation (Bazett) 465 ms    P Axis 26 degrees    R Axis -56 degrees    T Axis 18 degrees       RADIOLOGY:  Interpreted by Radiologist.  CT HEAD WO CONTRAST   Final Result   No acute intracranial abnormality. Periventricular white matter changes consistent with chronic microvascular   disease. Diffuse volume loss. XR CHEST PORTABLE   Final Result   No acute cardiopulmonary findings. PA and lateral views would be useful for   further assessment, if symptoms persist.             EKG:  This EKG is signed and interpreted by me. Rate: 93  Rhythm: Sinus  Interpretation: no acute changes  Comparison:compared  To previous          ------------------------- NURSING NOTES AND VITALS REVIEWED ---------------------------   The nursing notes within the ED encounter and vital signs as below have been reviewed by myself. BP (!) 155/84   Pulse 92   Temp 97.9 °F (36.6 °C) (Oral)   Resp 13   Ht 5' 7\" (1.702 m)   Wt 174 lb (78.9 kg)   SpO2 93%   BMI 27.25 kg/m²   Oxygen Saturation Interpretation: Normal    The patients available past medical records and past encounters were reviewed. ------------------------------ ED COURSE/MEDICAL DECISION MAKING----------------------  Medications   ondansetron (ZOFRAN) injection 4 mg (has no administration in time range)   meclizine (ANTIVERT) tablet 25 mg (has no administration in time range)             Medical Decision Making:    Presenting here because of feeling dizzy. Patient was weak. Patient reportedly had fallen to the ground he did not hit his head. Patient reporting no neck or back pain. Family members noted that he had some slurred speech. Patient here is not having the slurred speech he is oriented x3 he is motor exam is normal.  Patient labs and CTs noted reviewed. Patient was medicated. Patient was made aware of findings and plan. Re-Evaluations:             Re-evaluation. Patients symptoms show no change    Patient reevaluated and unchanged. Speech is clear. Patient moving all extremities patient made aware of findings his relatives at bedside and made aware of findings as well as plan for admission  Consultations:             Spoke to Dr Alfred Grissom he will admit    Critical Care: This patient's ED course included: a personal history and physicial eaxmination    This patient has been closely monitored during their ED course. Counseling: The emergency provider has spoken with the patient and discussed todays results, in addition to providing specific details for the plan of care and counseling regarding the diagnosis and prognosis. Questions are answered at this time and they are agreeable with the plan.       --------------------------------- IMPRESSION AND DISPOSITION ---------------------------------    IMPRESSION  1. Dizziness    2. TIA (transient ischemic attack)        DISPOSITION  Disposition: Admit to telemetry  Patient condition is stable        NOTE: This report was transcribed using voice recognition software.  Every effort was made to ensure accuracy; however, inadvertent computerized transcription errors may be present          Harini Davis MD  05/17/22 1485

## 2022-05-17 NOTE — ED NOTES
Patient's pants changed due to them being wet due to missing urinal.      Nita Walter, MENDEZ  05/17/22 [id number]

## 2022-05-17 NOTE — PLAN OF CARE
Problem: Chronic Conditions and Co-morbidities  Goal: Patient's chronic conditions and co-morbidity symptoms are monitored and maintained or improved  Outcome: Progressing     Problem: Discharge Planning  Goal: Discharge to home or other facility with appropriate resources  Recent Flowsheet Documentation  Taken 5/17/2022 0540 by Marissa Landis RN  Discharge to home or other facility with appropriate resources: Identify barriers to discharge with patient and caregiver     Problem: Pain  Goal: Verbalizes/displays adequate comfort level or baseline comfort level  Outcome: Progressing  Flowsheets (Taken 5/17/2022 0530)  Verbalizes/displays adequate comfort level or baseline comfort level: Encourage patient to monitor pain and request assistance

## 2022-05-17 NOTE — CARE COORDINATION
Transition of care: General surgery consulted. NPO. TIA. ? Neuro consult. Met with pt and pt's cousins (Robert Barrios and Khushboo) in room. Pt lives alone in a 2 story home. Has 1 stair to enter home. Pt's bedroom and full bath are on 2nd level. Pt was independent with ADLs and drove PTA. Not a . No DME. PCP is Dr Sylvia Martinez and pharmacy is [facility] on [address] Asked them about who is pt's medical POA and living will papers. Pt was unable to provide an answer and response from Khushboo was Dao Martinez is not important now. \" PT/OT will see when appropriate. Sw/cm will follow. #1 contact Feliz Razo -1st cousin.  SD#[phone]  #2 contact Sekou Crow -2nd cousin # [phone]

## 2022-05-17 NOTE — ED NOTES
Attempted to reach Dr. Ryland Cabello about patient's BP being 172/89 and continuing to tread up, unable to reach, will continue to call.       Mina Daniel RN  05/17/22 4123

## 2022-05-17 NOTE — PROGRESS NOTES
New consult for general surgery consult placed via perfect serve with surgical resident Dr. María Elena Balderrama.

## 2022-05-17 NOTE — CONSULTS
GENERAL SURGERY  CONSULT NOTE  5/17/2022    Physician Consulted: Dr. Ashley Robles  Reason for Consult: PEG Placement   Referring Physician: Dr. Berry VINCENT  Samantha Santiago is a 80 y.o. male with hx of DM who presented for evaluation of ongoing dizziness for a week. Per chart review patient was slurring speech and had difficulty walking with several falls at home. CT Head was negative for CVA and he is being managed for TIA. During his hospitalization he was noted to have difficulty with swallowing and there was concern for aspiration, we are consulted for consideration of PEG placement. MBSS showed aspiration to all consistencies and it was recommended patient remain NPO for oropharyngeal dysphagia. Surgical hx of cholecystectomy and prostatectomy. On ASA at home, no anticoagulation. No history of liver disease. Only scan of AP from 2011, reviewed with no evidence of liver dx or ascites. Afebrile, hemodynamically stable. Hb 15 wbc 7.1 LFT's within normal limits. Past Medical History:   Diagnosis Date    Diabetes mellitus ([facility] [address]     Dizziness     Thyroid disease        Past Surgical History:   Procedure Laterality Date    CATARACT REMOVAL      CHOLECYSTECTOMY      COLONOSCOPY      PROSTATE SURGERY         Medications Prior to Admission    Prior to Admission medications    Medication Sig Start Date End Date Taking? Authorizing Provider   levothyroxine (SYNTHROID) 88 MCG tablet Take 1 tablet by mouth daily 4/15/22   Jessica Pollard, DO   glipiZIDE (GLUCOTROL XL) 5 MG extended release tablet Take 1 tablet by mouth daily 3/30/22 6/28/22  Jessica Pollard, DO   metoprolol succinate (TOPROL XL) 25 MG extended release tablet Take 0.5 tablets by mouth daily Pt takes 1/2 tablet daily 3/2/22 5/31/22  Jessica Pollard, DO   Vitamin D (CHOLECALCIFEROL) 1000 UNITS CAPS capsule Take 1,000 Units by mouth daily.     Historical Provider, MD   vitamin B-12 (CYANOCOBALAMIN) 1000 MCG tablet Take 1,000 mcg by mouth daily.    Historical Provider, MD   aspirin 81 MG tablet Take 81 mg by mouth daily. Historical Provider, MD       No Known Allergies    Family History   Problem Relation Age of Onset    Diabetes Mother        Social History     Tobacco Use    Smoking status: Never Smoker    Smokeless tobacco: Never Used   Substance Use Topics    Alcohol use: No    Drug use: No         Review of Systems: pertinent ROS listed in HPI, all others negative       PHYSICAL EXAM:    Vitals:    05/17/22 1459   BP: 128/73   Pulse: 90   Resp: 11   Temp: 98.3 °F (36.8 °C)   SpO2: 90%       GENERAL:  NAD. HEAD:  Normocephalic. Atraumatic. EYES:   No scleral icterus. PERRL. LUNGS:  No increased work of breathing. CARDIOVASCULAR: RR  ABDOMEN:  Soft, non-distended, non-tender. No guarding, rigidity, rebound. Surgical scar from prior cholecystectomy noted  EXTREMITIES:   MAEx4. Atraumatic. No LE edema. SKIN:  Warm and dry      ASSESSMENT/PLAN:  80 y.o. male with dysphagia and risk for aspiration in the setting of recent TIA in need of PEG placement     Plan on PEG placement 5/18 should patient and family decide that they would like to proceed. We will discuss with family and update accordingly- Patient has decided against PEG placement at this time, no plans for surgical intervention  NPO- recommend waiver for pleasure feedings or corpak placement for nutrition by IV team. Please contact us should the family decide on PEG in the coming days  Appreciate remainder of care per primary team    Assessment and plan discussed with Dr. Darrick Quinteros by Dr. Jo Dickson.     Victor Hugo Valenzuela MD  Surgery Resident PGY-1  5/17/2022  6:22 PM

## 2022-05-17 NOTE — PROGRESS NOTES
Dr Hudson Dockery on unit. Notified of new admit requiring orders.  to place them shortly. Andie Gonsalez RN

## 2022-05-17 NOTE — PATIENT CARE CONFERENCE
[facility] Quality Flow/Interdisciplinary Rounds Progress Note        Quality Flow Rounds held on May 17, 2022    Disciplines Attending:  Bedside Nurse, ,  and Nursing Unit Leadership    Chano Handy was admitted on 5/16/2022  9:34 PM    Anticipated Discharge Date:       Disposition:    Micha Score:  Micha Scale Score: 17    Readmission Risk              Risk of Unplanned Readmission:  0           Discussed patient goal for the day, patient clinical progression, and barriers to discharge.   The following Goal(s) of the Day/Commitment(s) have been identified:  Diagnostics - Report Results and Labs - Report Results      Brianna Arias RN  May 17, 2022

## 2022-05-17 NOTE — ED NOTES
Nurse to nurse given to [address] [place] [address],[address].       Susy White RN  05/17/22 8502

## 2022-05-17 NOTE — H&P
Iris Garcia is a 80 y.o. male presenting to the ED for dizzy and weak, beginning since 7:30 AM ago. Patient reports feeling dizzy having nausea and vomited once prior arrival.  Patient reporting no chest pain no difficulty breathing or abdominal pain. Patient reportedly was slurring his speech about an hour ago patient currently not slurring speech. Patient reporting no upper or lower extremity paralysis there is no facial droop. Patient reporting no headache or visual changes. Patient reporting no cough or fever. Patient does live alone at home. Per report cousin noted that he was slurring his speech. states his legs feels heavy and weak difficulty walking had couple falls at home . Ct scan head no acute event admitted to monitor     Past Medical History:   Diagnosis Date    Diabetes mellitus ([address]     Dizziness     Thyroid disease        Past Surgical History:   Procedure Laterality Date    CATARACT REMOVAL      CHOLECYSTECTOMY      COLONOSCOPY      PROSTATE SURGERY         Family History   Problem Relation Age of Onset    Diabetes Mother        Prior to Admission medications    Medication Sig Start Date End Date Taking? Authorizing Provider   levothyroxine (SYNTHROID) 88 MCG tablet Take 1 tablet by mouth daily 4/15/22   Jessica Pollard, DO   glipiZIDE (GLUCOTROL XL) 5 MG extended release tablet Take 1 tablet by mouth daily 3/30/22 6/28/22  Jessica Pollard, DO   metoprolol succinate (TOPROL XL) 25 MG extended release tablet Take 0.5 tablets by mouth daily Pt takes 1/2 tablet daily 3/2/22 5/31/22  Jessica Pollard, DO   Vitamin D (CHOLECALCIFEROL) 1000 UNITS CAPS capsule Take 1,000 Units by mouth daily. Historical Provider, MD   vitamin B-12 (CYANOCOBALAMIN) 1000 MCG tablet Take 1,000 mcg by mouth daily. Historical Provider, MD   aspirin 81 MG tablet Take 81 mg by mouth daily. Historical Provider, MD        Allergies: Patient has no known allergies.     Social History     Tobacco Use    Smoking status: Never Smoker    Smokeless tobacco: Never Used   Substance Use Topics    Alcohol use: No        Review of Systems:  Respiratory: negative for cough and hemoptysis  Cardiovascular: negative for chest pain and dyspnea  Gastrointestinal: negative for abdominal pain, diarrhea, nausea and vomiting  Genitourinary:negative for dysuria and hematuria  Derm: negative for rash and skin lesion(s)  Neurological: negative for seizures and tremors, pos. Dizziness   Endocrine: negative for diabetic symptoms including polydipsia and polyuria    Physical Exam:  Vitals:    05/17/22 0723   BP: (!) 142/87   Pulse: 95   Resp: 16   Temp: 99.5 °F (37.5 °C)   SpO2: 93%      Skin:  Warm and dry.   No rash or bruises  HEENT:  PERRLA, EOMI  Neck:  No JVD, No thyromegaly, No carotid bruit  Cardiac:  RRR, No gallop or murmur  Lungs:  CTA, Normal percussion  Abdomen: Normal bowel sounds, no HSM, non-tender  Extremities:  No clubbing, trace edema no cyanosis  Neurological:  Moves all extremities ,weakness legs     Labs:    CBC with Differential:    Lab Results   Component Value Date    WBC 7.1 05/16/2022    RBC 4.92 05/16/2022    HGB 15.0 05/16/2022    HCT 43.9 05/16/2022     05/16/2022    MCV 89.2 05/16/2022    MCH 30.5 05/16/2022    MCHC 34.2 05/16/2022    RDW 13.2 05/16/2022    LYMPHOPCT 16.9 05/16/2022    MONOPCT 9.7 05/16/2022    EOSPCT 1.7 06/08/2021    BASOPCT 0.6 05/16/2022    MONOSABS 0.69 05/16/2022    LYMPHSABS 1.20 05/16/2022    EOSABS 0.02 05/16/2022    BASOSABS 0.04 05/16/2022     CMP:    Lab Results   Component Value Date     05/16/2022    K 3.7 05/16/2022     05/16/2022    CO2 18 05/16/2022    BUN 11 05/16/2022    CREATININE 1.4 05/16/2022    GFRAA 58 05/16/2022    LABGLOM 48 05/16/2022    GLUCOSE 162 05/16/2022    GLUCOSE 274 03/01/2011    PROT 7.1 05/16/2022    LABALBU 4.1 05/16/2022    LABALBU 4.0 02/28/2011    CALCIUM 8.7 05/16/2022    BILITOT 0.5 05/16/2022    ALKPHOS 92 05/16/2022    AST 25 05/16/2022    ALT 20 05/16/2022      Imaging:Ct scan head :  No acute intracranial abnormality.       Periventricular white matter changes consistent with chronic microvascular   disease.  Diffuse volume loss.            Assessment and Plan:    Patient Active Problem List   Diagnosis    Dizziness with recurrent fall   TIA   DM  HTN  Hypothyroid

## 2022-05-18 ENCOUNTER — APPOINTMENT (OUTPATIENT)
Dept: MRI IMAGING | Age: 87
DRG: 069 | End: 2022-05-18
Payer: MEDICARE

## 2022-05-18 LAB
CHOLESTEROL, TOTAL: 100 MG/DL (ref 0–199)
HBA1C MFR BLD: 6.3 % (ref 4–5.6)
HDLC SERPL-MCNC: 45 MG/DL
LDL CHOLESTEROL CALCULATED: 43 MG/DL (ref 0–99)
TRIGL SERPL-MCNC: 60 MG/DL (ref 0–149)
VLDLC SERPL CALC-MCNC: 12 MG/DL

## 2022-05-18 PROCEDURE — 97530 THERAPEUTIC ACTIVITIES: CPT

## 2022-05-18 PROCEDURE — 99232 SBSQ HOSP IP/OBS MODERATE 35: CPT | Performed by: INTERNAL MEDICINE

## 2022-05-18 PROCEDURE — 2140000000 HC CCU INTERMEDIATE R&B

## 2022-05-18 PROCEDURE — 83036 HEMOGLOBIN GLYCOSYLATED A1C: CPT

## 2022-05-18 PROCEDURE — 2580000003 HC RX 258: Performed by: INTERNAL MEDICINE

## 2022-05-18 PROCEDURE — 6370000000 HC RX 637 (ALT 250 FOR IP): Performed by: STUDENT IN AN ORGANIZED HEALTH CARE EDUCATION/TRAINING PROGRAM

## 2022-05-18 PROCEDURE — 36415 COLL VENOUS BLD VENIPUNCTURE: CPT

## 2022-05-18 PROCEDURE — 70547 MR ANGIOGRAPHY NECK W/O DYE: CPT

## 2022-05-18 PROCEDURE — 70544 MR ANGIOGRAPHY HEAD W/O DYE: CPT

## 2022-05-18 PROCEDURE — 99222 1ST HOSP IP/OBS MODERATE 55: CPT | Performed by: PSYCHIATRY & NEUROLOGY

## 2022-05-18 PROCEDURE — 92526 ORAL FUNCTION THERAPY: CPT

## 2022-05-18 PROCEDURE — 2700000000 HC OXYGEN THERAPY PER DAY

## 2022-05-18 PROCEDURE — 80061 LIPID PANEL: CPT

## 2022-05-18 PROCEDURE — 70551 MRI BRAIN STEM W/O DYE: CPT

## 2022-05-18 PROCEDURE — 97161 PT EVAL LOW COMPLEX 20 MIN: CPT

## 2022-05-18 RX ORDER — CLOPIDOGREL BISULFATE 75 MG/1
75 TABLET ORAL DAILY
Status: DISCONTINUED | OUTPATIENT
Start: 2022-05-18 | End: 2022-05-20 | Stop reason: HOSPADM

## 2022-05-18 RX ORDER — ATORVASTATIN CALCIUM 40 MG/1
40 TABLET, FILM COATED ORAL NIGHTLY
Status: DISCONTINUED | OUTPATIENT
Start: 2022-05-18 | End: 2022-05-20 | Stop reason: HOSPADM

## 2022-05-18 RX ADMIN — DEXTROSE AND SODIUM CHLORIDE: 5; 900 INJECTION, SOLUTION INTRAVENOUS at 22:19

## 2022-05-18 RX ADMIN — ATORVASTATIN CALCIUM 40 MG: 40 TABLET, FILM COATED ORAL at 22:16

## 2022-05-18 ASSESSMENT — PAIN SCALES - GENERAL: PAINLEVEL_OUTOF10: 0

## 2022-05-18 NOTE — PROGRESS NOTES
Admit Date: 5/16/2022    Subjective:     Awake cough failed swallowing test declined PEG     Objective:     Patient Vitals for the past 8 hrs:   BP Temp Temp src Pulse Resp SpO2   05/18/22 0509 132/74 97.6 °F (36.4 °C) Temporal 92 16 92 %     I/O last 3 completed shifts: In: 0   Out: 825 [Urine:575; Emesis/NG output:250]  No intake/output data recorded. HEENT: Normal  NECK: Thyroid normal. No carotid bruit. CVS: RRR  RS: Clear. No wheeze. No rhonchi. ABD: Soft. Non tender. No mass. Normal BS. EXT: No edema. Non tender. Pulses present.    NEURO: moving all extremities       Scheduled Meds:   aspirin  81 mg Oral Daily    glipiZIDE  5 mg Oral QAM AC    levothyroxine  88 mcg Oral Daily    metoprolol succinate  12.5 mg Oral Daily    vitamin B-12  1,000 mcg Oral Daily    Vitamin D  1,000 Units Oral Daily     Continuous Infusions:   dextrose 5 % and 0.9 % NaCl 75 mL/hr at 05/17/22 1216       CBC with Differential:    Lab Results   Component Value Date    WBC 7.1 05/16/2022    RBC 4.92 05/16/2022    HGB 15.0 05/16/2022    HCT 43.9 05/16/2022     05/16/2022    MCV 89.2 05/16/2022    MCH 30.5 05/16/2022    MCHC 34.2 05/16/2022    RDW 13.2 05/16/2022    LYMPHOPCT 16.9 05/16/2022    MONOPCT 9.7 05/16/2022    EOSPCT 1.7 06/08/2021    BASOPCT 0.6 05/16/2022    MONOSABS 0.69 05/16/2022    LYMPHSABS 1.20 05/16/2022    EOSABS 0.02 05/16/2022    BASOSABS 0.04 05/16/2022     CMP:    Lab Results   Component Value Date     05/16/2022    K 3.7 05/16/2022     05/16/2022    CO2 18 05/16/2022    BUN 11 05/16/2022    CREATININE 1.4 05/16/2022    GFRAA 58 05/16/2022    LABGLOM 48 05/16/2022    PROT 7.1 05/16/2022    LABALBU 4.1 05/16/2022    LABALBU 4.0 02/28/2011    CALCIUM 8.7 05/16/2022    BILITOT 0.5 05/16/2022    ALKPHOS 92 05/16/2022    AST 25 05/16/2022    ALT 20 05/16/2022     PT/INR:    Lab Results   Component Value Date    PROTIME 13.2 02/28/2011    INR 1.4 02/28/2011       Assessment:     Principal Problem:   Dizziness with recurrent fall   TIA   DM  HTN  Hypothyroid   Dysphagia       Plan:   Continue IV fluid ,neuro evaluation ,need waiver for feeding

## 2022-05-18 NOTE — CONSULTS
No  · Visions loss: No    Ears, Nose, Mouth, and Throat  · Difficulty swallowing: Yes    Cardiovascular  · Chest Pain: No    Respiratory  · Shortness of Breath: No    Gastrointestinal  · Abdominal Pain: No    Genitourinary  · Difficulty with Urination: No    Integumentary  · Rash: No    Musculoskeletal  · Back Pain: No    Neurological  · Headaches: No  · Weakness: No  · Numbness: No  · Seizures: No  · Difficulty with Memory: No  · Further symptoms noted in HPI    Psychiatric  · Anxiety: No  · Depression: No    Complete 10-point review of systems is negative except as noted above in my HPI    Medical History:   Past Medical History:   Diagnosis Date    Diabetes mellitus ([facility] [address]     Dizziness     Thyroid disease         Surgical History:   Past Surgical History:   Procedure Laterality Date    CATARACT REMOVAL      CHOLECYSTECTOMY      COLONOSCOPY      PROSTATE SURGERY          Family History:  Family History   Problem Relation Age of Onset    Diabetes Mother        Social History:  Social History     Tobacco Use    Smoking status: Never Smoker    Smokeless tobacco: Never Used   Substance Use Topics    Alcohol use: No    Drug use: No        Current Medications:      Current Facility-Administered Medications   Medication Dose Route Frequency Provider Last Rate Last Admin    perflutren lipid microspheres (DEFINITY) injection 1.65 mg  1.5 mL IntraVENous ONCE PRN Vic Phillips MD        clopidogrel (PLAVIX) tablet 75 mg  75 mg Oral Daily Vic Phillips MD        atorvastatin (LIPITOR) tablet 40 mg  40 mg Oral Nightly Vic Phillips MD        ondansetron [facility]) injection 4 mg  4 mg IntraVENous Q6H PRN Carly Carr MD   4 mg at 05/17/22 0538    aspirin chewable tablet 81 mg  81 mg Oral Daily Danyell Freeman MD        glipiZIDE (GLUCOTROL) tablet 5 mg  5 mg Oral QAM AC Danyell Freeman MD        levothyroxine (SYNTHROID) tablet 88 mcg  88 mcg Oral Daily Danyell Freeman MD        metoprolol succinate (TOPROL XL) extended release tablet 12.5 mg  12.5 mg Oral Daily Beatris Armenta MD        vitamin B-12 (CYANOCOBALAMIN) tablet 1,000 mcg  1,000 mcg Oral Daily Beatris Armenta MD        Vitamin D (CHOLECALCIFEROL) tablet 1,000 Units  1,000 Units Oral Daily Beatris Armenta MD        meclizine (ANTIVERT) tablet 12.5 mg  12.5 mg Oral TID PRN Beatris Armenta MD        dextrose 5 % and 0.9 % sodium chloride infusion   IntraVENous Continuous Beatris Armenta MD 75 mL/hr at 05/17/22 1216 New Bag at 05/17/22 1216        Allergies:      No Known Allergies     Physical Examination  Vitals   Vitals:    05/17/22 2100 05/18/22 0509 05/18/22 0751 05/18/22 1121   BP: 118/70 132/74 135/70 124/74   Pulse: 94 92 75 82   Resp: 18 16 18    Temp: 97.7 °F (36.5 °C) 97.6 °F (36.4 °C) 98.1 °F (36.7 °C) 97.1 °F (36.2 °C)   TempSrc: Temporal Temporal Oral Temporal   SpO2: 90% 92% 95% 94%   Weight:       Height:            General: Patient appears in no acute distress. Awake and oriented x3. HEENT: Normocephalic, atraumatic  Chest: Clear to auscultation bilaterally  Heart: No murmurs appreciated  Extremities/Peripheral vascular: No edema/swelling noted. No cold limbs noted. Neurologic Examination    Mental Status  Alert, and oriented to person, place and time. Speech is broken with mild dysarthria, intact comprehension. No evidence of memory impairment. Attention and concentration appeared normal.     Cranial Nerves  II. Visual fields full to confrontation bilaterally. III, IV, VI: Pupils equally round and reactive to light, 3 to 2 mm bilaterally. EOMs: full, left beating nystagmus when gazing to right  V. Facial sensation intact to light touch bilaterally  VII: Facial movements symmetric and strong  VIII: Hearing intact to voice  IX,X: Palate elevates symmetrically. Dysarthria present.   XI: Sternocleidomastoid and trapezius 5/5 bilaterally   XII: Tongue is midline    Motor     Right Left   Right Left   Deltoid 5 5 Hip Flexion 5 5   Biceps      5  5  Knee Extension 5 5   Triceps 5 5  Knee Flexion 5 5   Handgrip 5 4+  Ankle Dorsiflexion 5 5       Ankle Plantarflexion 5 5     Tone: Normal in all four limbs    Bulk: Normal in all four limbs with no evidence of atrophy    Pronator drift: absent bilaterally    Sensation  · Light Touch: Intact distally in all four limbs  · Pinprick: Diminished in bilateral feet  · Vibration: Diminished in bilateral feet  · Proprioception: Intact distally in all four limbs    Reflexes     Right Left   Biceps 2 2   Brachioradialis 2 2   Triceps 2 2   Patellar 2 2   Achilles 2 2   ankle clonus none none     Toes down going bilaterally. Coordination  Left sided ataxia with apid alternating movements and finger to nose testing  Heel to shin testing normal bilaterally    Gait  Unsteady upon standing  Deferred for safety/fall consideration      Labs  Recent Labs     05/16/22  2205 05/18/22  0856     --    K 3.7  --    *  --    CO2 18*  --    BUN 11  --    CREATININE 1.4*  --    GLUCOSE 162*  --    CALCIUM 8.7  --    PROT 7.1  --    LABALBU 4.1  --    BILITOT 0.5  --    ALKPHOS 92  --    AST 25  --    ALT 20  --    WBC 7.1  --    RBC 4.92  --    HGB 15.0  --    HCT 43.9  --    MCV 89.2  --    MCH 30.5  --    MCHC 34.2  --    RDW 13.2  --      --    MPV 10.5  --    HDL  --  45   LDLCALC  --  43   LABA1C  --  6.3*       Imaging  FL MODIFIED BARIUM SWALLOW W VIDEO   Final Result   Positive examination for aspiration with nectar and pudding textures. Please see separate speech pathology report for full discussion of findings   and recommendations. CT HEAD WO CONTRAST   Final Result   No acute intracranial abnormality. Periventricular white matter changes consistent with chronic microvascular   disease. Diffuse volume loss. XR CHEST PORTABLE   Final Result   No acute cardiopulmonary findings.   PA and lateral views would be useful for   further assessment, if symptoms persist.         MRI BRAIN WO CONTRAST    (Results Pending)   MRA HEAD WO CONTRAST    (Results Pending)   MRA NECK WO CONTRAST    (Results Pending)         I have personally reviewed the following images: CT head       Electronically signed by Daniel Medina MD on 5/18/2022 at 1:02 PM

## 2022-05-18 NOTE — PROGRESS NOTES
Physical Therapy  Physical Therapy Initial Assessment     Name: Scott Garcia  : 1934  MRN: [id number]      Date of Service: 2022    Evaluating PT:  Anthony Rasheed, PT, DPT [id number]    Room #:  4623/9625-F  Diagnosis:  TIA (transient ischemic attack) [G45.9]  Dizziness [R42]  PMHx/PSHx:  Dizziness, thyroid disease, DM  Procedure/Surgery:  None  Precautions:  Falls, O2  Equipment Needs:  TBD  Equipment Owned:  Cane, walker    SUBJECTIVE:    Pt lives alone in a 2 story home + basement with 1 stair(s) to enter and 0 rail(s). Bed is on 2nd floor and bath is on 2nd floor. There is a full flight of stairs with 2 rails to basement; full flight of stairs with 1 rail + structural supports that patient reports holding onto to 2nd floor. Pt ambulated with no AD PTA. OBJECTIVE:   Initial Evaluation  Date: 2022 Treatment Short Term/ Long Term   Goals   AM-PAC 6 Clicks 83/64     Was pt agreeable to Eval/treatment? Yes     Does pt have pain? No     Bed Mobility  Rolling: NT  Supine to sit: [place]  Sit to supine: [place]  Scooting: [place] (seated)  Rolling: Independent  Supine to sit: Independent  Sit to supine: Independent  Scooting: Independent   Transfers Sit to stand: ModA  Stand to sit: ModA  Stand pivot: NT  Sit to stand: Susan  Stand to sit: Susan  Stand pivot: Susan with AAD   Ambulation    3 feet with no AD MaxA  100 feet with AAD Susan   Stair negotiation: ascended and descended  NT  12 step(s) with 2 rail(s) Susan   ROM BUE:  See OT note  BLE:  WFL     Strength BUE:  See OT note  BLE:  Grossly 5/5     Balance Sitting EOB:  Supervision  Dynamic Standing:  MaxA with no AD  Sitting EOB:  Independent  Dynamic Standing:  Susan with AAD     Pt is A & O x 4  Sensation:  Pt denies numbness and tingling to extremities  Edema:  Unremarkable    Vitals:   HR 80-90, SpO2 95% (3 L O2) during/following activity.     Patient education  Pt educated on role of PT, safety during functional mobility, use of call light for assistance. Patient response to education:   Pt verbalized understanding Pt demonstrated skill Pt requires further education in this area   Yes Yes Yes     ASSESSMENT:    Conditions Requiring Skilled Therapeutic Intervention:    [x]Decreased strength     []Decreased ROM  [x]Decreased functional mobility  [x]Decreased balance   [x]Decreased endurance   [x]Decreased posture  []Decreased sensation  []Decreased coordination   []Decreased vision  [x]Decreased safety awareness   []Increased pain       Comments:  Session cleared by nursing. Patient in semi-King's position upon arrival; agreeable to PT evaluation. Required increased time to perform bed mobility. Tolerated sitting EOB without balance loss. Performed multiple sit <> stand transfers; required increased time, verbal cues related to positioning/sequencing to ensure safety during such. Performed side steps with decreased speed, decreased step height/length, unsteadiness. Required verbal cues to facilitate weight shifting and subsequent foot advancement during activity. Denied dizziness. Vitals monitored and noted. Patient left in semi-King's position with call light in reach. Patient would benefit from continued skilled PT services to address functional deficits. Treatment:  Patient practiced and was instructed in the following treatment:     Bed mobility - verbal cues to facilitate proper positioning; physical assistance provided during activity   Static sitting - performed to promote upright tolerance   Functional transfers - verbal cues to facilitate proper positioning and sequencing, particularly related to hand/foot placement; physical assistance provided during activity   Ambulation - verbal cues to facilitate proper positioning and sequencing, particularly related to weight shifting and foot placement; physical assistance provided during activity    Pt's/ family goals   1.  None stated    Prognosis is good for reaching above PT goals. Patient and or family understand(s) diagnosis, prognosis, and plan of care. Yes    PHYSICAL THERAPY PLAN OF CARE:    PT POC is established based on physician order and patient diagnosis     Referring provider/PT Order:    05/17/22 0815  PT eval and treat  Start:  05/17/22 0815,   End:  05/17/22 0815,   ONE TIME,   Standing Count:  1 Occurrences,   R         Love Perdomo MD       Diagnosis:  TIA (transient ischemic attack) [G45.9]  Dizziness [R42]  Specific instructions for next treatment:  Continue to facilitate safe performance of functional mobility; progress as appropriate. Current Treatment Recommendations:     [x] Strengthening to improve independence with functional mobility   [] ROM to improve independence with functional mobility   [x] Balance Training to improve static/dynamic balance and to reduce fall risk  [x] Endurance Training to improve activity tolerance during functional mobility   [x] Transfer Training to improve safety and independence with all functional transfers   [x] Gait Training to improve gait mechanics, endurance and assess need for appropriate assistive device  [x] Stair Training in preparation for safe discharge home and/or into the community   [x] Positioning to prevent skin breakdown and contractures  [x] Safety and Education Training   [x] Patient/Caregiver Education   [] HEP  [] Other     PT long term treatment goals are located in above grid    Frequency of treatments: 2-5x/week x 1-2 weeks. Time in  1215  Time out  1240    Total Treatment Time  10 minutes     Evaluation Time includes thorough review of current medical information, gathering information on past medical history/social history and prior level of function, completion of standardized testing/informal observation of tasks, assessment of data and education on plan of care and goals.     CPT codes:  [x] Low Complexity PT evaluation 22315  [] Moderate Complexity PT evaluation 23559  [] High Complexity PT evaluation [id number]  [] PT Re-evaluation [id number]  [] Gait training 76554 0 minutes  [] Manual therapy 25714 0 minutes  [x] Therapeutic activities 84142 10 minutes  [] Therapeutic exercises 68601 0 minutes  [] Neuromuscular reeducation 13821 0 minutes     Todd Arora, PT, DPT  [id number]

## 2022-05-18 NOTE — CARE COORDINATION
Neuro consulted. IVFs at 75ml/hr. MRI brain ordered. General surgery consulted for peg tube-Patient has decided against PEG placement at this time. OT damari hahn-pac 9/24. Called therapy dept and asked for physical therapy to see pt. Sw/cm will follow.

## 2022-05-18 NOTE — PATIENT CARE CONFERENCE
[facility] Quality Flow/Interdisciplinary Rounds Progress Note        Quality Flow Rounds held on May 18, 2022    Disciplines Attending:  Bedside Nurse, ,  and Nursing Unit Leadership    Melissa Adam was admitted on 5/16/2022  9:34 PM    Anticipated Discharge Date:  Expected Discharge Date: 05/20/22    Disposition:    Micha Score:  Micha Scale Score: 16    Readmission Risk              Risk of Unplanned Readmission:  0           Discussed patient goal for the day, patient clinical progression, and barriers to discharge.   The following Goal(s) of the Day/Commitment(s) have been identified:  Diagnostics - Report Results and Labs - Report Results      Kyra Graham RN  May 18, 2022

## 2022-05-19 ENCOUNTER — APPOINTMENT (OUTPATIENT)
Dept: GENERAL RADIOLOGY | Age: 87
DRG: 069 | End: 2022-05-19
Payer: MEDICARE

## 2022-05-19 LAB
LV EF: 58 %
LVEF MODALITY: NORMAL

## 2022-05-19 PROCEDURE — 92526 ORAL FUNCTION THERAPY: CPT

## 2022-05-19 PROCEDURE — 2500000003 HC RX 250 WO HCPCS: Performed by: RADIOLOGY

## 2022-05-19 PROCEDURE — 92611 MOTION FLUOROSCOPY/SWALLOW: CPT

## 2022-05-19 PROCEDURE — 93307 TTE W/O DOPPLER COMPLETE: CPT

## 2022-05-19 PROCEDURE — 6370000000 HC RX 637 (ALT 250 FOR IP): Performed by: STUDENT IN AN ORGANIZED HEALTH CARE EDUCATION/TRAINING PROGRAM

## 2022-05-19 PROCEDURE — 2580000003 HC RX 258: Performed by: INTERNAL MEDICINE

## 2022-05-19 PROCEDURE — 2140000000 HC CCU INTERMEDIATE R&B

## 2022-05-19 PROCEDURE — 74230 X-RAY XM SWLNG FUNCJ C+: CPT

## 2022-05-19 PROCEDURE — 99231 SBSQ HOSP IP/OBS SF/LOW 25: CPT | Performed by: INTERNAL MEDICINE

## 2022-05-19 RX ADMIN — ATORVASTATIN CALCIUM 40 MG: 40 TABLET, FILM COATED ORAL at 21:13

## 2022-05-19 RX ADMIN — BARIUM SULFATE 15 ML: 400 PASTE ORAL at 14:01

## 2022-05-19 RX ADMIN — BARIUM SULFATE 15 ML: 0.81 POWDER, FOR SUSPENSION ORAL at 14:01

## 2022-05-19 RX ADMIN — BARIUM SULFATE 15 ML: 400 SUSPENSION ORAL at 14:01

## 2022-05-19 RX ADMIN — DEXTROSE AND SODIUM CHLORIDE: 5; 900 INJECTION, SOLUTION INTRAVENOUS at 12:08

## 2022-05-19 NOTE — CARE COORDINATION
[facility] accepted, updated family they would like [facility], pt is declining to go to [facility], expressed concern for his safety, explained several times that he is to weak to go home alone, reminded him it will be short term, then home. Pt will think about this overnight, will follow up in morning. Josh Mcnamara, MSW, LSW

## 2022-05-19 NOTE — PLAN OF CARE
Problem: Chronic Conditions and Co-morbidities  Goal: Patient's chronic conditions and co-morbidity symptoms are monitored and maintained or improved  Outcome: Progressing  Flowsheets (Taken 5/18/2022 2045)  Care Plan - Patient's Chronic Conditions and Co-Morbidity Symptoms are Monitored and Maintained or Improved: Monitor and assess patient's chronic conditions and comorbid symptoms for stability, deterioration, or improvement     Problem: Pain  Goal: Verbalizes/displays adequate comfort level or baseline comfort level  Outcome: Progressing     Problem: ABCDS Injury Assessment  Goal: Absence of physical injury  Recent Flowsheet Documentation  Taken 5/19/2022 0022 by Arlene Libman, RN  Absence of Physical Injury: Implement safety measures based on patient assessment

## 2022-05-19 NOTE — CARE COORDINATION
Met with pt and pt's cousins, Natalie Bolanos, in room. Pt for repeat swallow eval. 2d echo done this am. PT eval am-pac 12/24 and ambulated 3ft with no AD maxA. OT eval am-pac 9/24. We discussed discharge planning. They are not interested in aru. I explained to them x 2 about subacute rehab. Pt states he would really like to go home at discharge. Pt lives alone and will not have anyone to stay with 24/7 after dc. I provided Cachorro Martins with jonah list for [facility] to make choices. Cachorro Martins and Vera Winters will continue to talk with pt about going to [facility]. Sw/cm will follow. The Plan for Transition of Care is related to the following treatment goals: jonah   The Patient and/or patient representative  was provided with a choice of provider and agrees   with the discharge plan. [x] Yes [] No  Freedom of choice list was provided with basic dialogue that supports the patient's individualized plan of care/goals, treatment preferences and shares the quality data associated with the providers.  [x] Yes [] No-

## 2022-05-19 NOTE — PATIENT CARE CONFERENCE
[facility] Quality Flow/Interdisciplinary Rounds Progress Note        Quality Flow Rounds held on May 19, 2022    Disciplines Attending:  Bedside Nurse, ,  and Nursing Unit Leadership    Chano Handy was admitted on 5/16/2022  9:34 PM    Anticipated Discharge Date:  Expected Discharge Date: 05/20/22    Disposition:    Micha Score:  Micha Scale Score: 20    Readmission Risk              Risk of Unplanned Readmission:  9           Discussed patient goal for the day, patient clinical progression, and barriers to discharge.   The following Goal(s) of the Day/Commitment(s) have been identified:  Diagnostics - Report Results      Ashley Arellano RN  May 19, 2022

## 2022-05-19 NOTE — PROGRESS NOTES
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

## 2022-05-19 NOTE — PLAN OF CARE
Patient not in room at this time he is having study completed. Will try back later if time permits. MRI brain is negative for acute stroke.

## 2022-05-19 NOTE — PROGRESS NOTES
SPEECH/LANGUAGE PATHOLOGY  VIDEOFLUOROSCOPIC STUDY OF SWALLOWING (MBS)   and PLAN OF CARE    PATIENT NAME:  Audrey Umana  (male)     MRN:  [id number]    :  1934  (80 y.o.)  STATUS:  Inpatient: Room 5041/9023-U    TODAY'S DATE:  2022  REFERRING PROVIDER:   Dr. Sarah Glover: SLP video swallow  Date of order:  22   REASON FOR REFERRAL: re-eval   EVALUATING THERAPIST: ALCON Edwards      RESULTS:      DYSPHAGIA DIAGNOSIS:  mild-moderate pharyngeal phase dysphagia     DIET RECOMMENDATIONS:  Regular consistency solids (IDDSI level 7) with  thin liquids (IDDSI level 0)    FEEDING RECOMMENDATIONS:    Assistance level:  No assistance needed     Compensatory strategies recommended: Double swallow and Small bites/sips     Discussed recommendations with nursing and/or faxed report to referring provider: Nursing not available, diet change recommendation placed in Physician sticky note section       SPEECH THERAPY  PLAN OF CARE   The dysphagia POC is established based on physician order and dysphagia diagnosis    Skilled SLP intervention for dysphagia management up to 5x per week until goals met, pt plateaus in function and/or discharged from hospital      Conditions Requiring Skilled Therapeutic Intervention for dysphagia:    Reduced pharyngeal clearing of the bolus  Reduced laryngeal closure resulting in penetration    SPECIFIC DYSPHAGIA INTERVENTIONS TO INCLUDE:     Compensatory strategy training   Therapeutic exercises    Specific instructions for next treatment:  initiate instruction of therapeutic exercises  and initiate instruction of compensatory strategies  Treatment Goals:    Short Term Goals:  Pt will implement identified compensatory swallowing strategies on 90% of opportunities or greater to improve airway protection and swallow function.   Pt will complete BOTR strength/ ROM exercises to reduce pharyngeal residuals and improve epiglottic inversion minimal verbal prompts  Pt will complete laryngeal strength/ ROM therapeutic exercises to improve airway protection for the least restrictive PO diet minimal verbal prompts    Long Term Goals:   Pt will improve oropharyngeal swallow function to ensure airway protection during PO intake to maintain adequate nutrition/hydration and decrease signs/symptoms of aspiration to less than 1 x/day.       Patient/family Goal:    To be able to [address] [place] [address] discussed with Patient   The Patient understand(s) the diagnosis, prognosis and plan of care     Rehabilitation Potential/Prognosis: good                      ADMITTING DIAGNOSIS: TIA (transient ischemic attack) [G45.9]  Dizziness [R42]     VISIT DIAGNOSIS:         PATIENT REPORT/COMPLAINT: patient currently NPO pending results of this evaluation    PRIOR LEVEL OF SWALLOW FUNCTION:    Past History of Dysphagia?:  yes    Current Diet Order:  Diet NPO    PROCEDURE:  Consistencies Administered During the Evaluation   Liquids: thin liquid and nectar thick liquid   Solids:  pureed foods and solid foods      Method of Intake:   cup, straw, spoon  Self fed, Fed by clinician      Position:   Seated, upright, Lateral plane    INSTRUMENTAL ASSESSMENT:    ORAL PREP/ ORAL PHASE:    The oral stage of swallowing was within functional limits for consistencies administered     PHARYNGEAL PHASE:     ONSET TIME       Onset time of the pharyngeal swallow was adequate       PHARYNGEAL RESIDUALS        Vallecula/Pharyngeal Wall           Reduced tongue base retraction resulting in residuals in vallecula and/or posterior pharyngeal wall for pureed foods and solid foods which mostly cleared with liquid chaser       Pyriform Sinuses      No significant residuals were noted in the pyriform sinuses     LARYNGEAL PENETRATION   Laryngeal penetration occurred in the absence of aspiration DURING the swallow for thin liquid due to  delayed laryngeal closure which cleared from the laryngeal vestibule spontaneously (transient). Laryngeal penetration was minimal and occurred inconsistently   an absent cough/throat clear was noted    ASPIRATION  Aspiration was not present during this evaluation    PENETRATION-ASPIRATION SCALE (PAS):  THIN 2 = Material enters the airway, remains above vocal folds, and is ejected from the airway   MILDLY THICK 1 = Material does not enter the airway  MODERATELY THICK item not administered  PUREE 1 = Material does not enter the airway  HARD SOLID 1 = Material does not enter the airway       COMPENSATORY STRATEGIES    Compensatory strategies that were beneficial included Double swallow and Small bites/sips      STRUCTURAL/FUNCTIONAL ANOMALIES   No structural/functional anomalies were noted    CERVICAL ESOPHAGEAL STAGE :     The cervical esophagus appeared adequate          ___________    Cognition:   Within functional limits for this exam    Oral Peripheral Examination   Adequate lingual/labial strength     Current Respiratory Status   room air     Parameters of Speech Production  Respiration:  Adequate for speech production  Quality:   Within functional limits  Intensity: Within functional limits    Pain: No pain reported. EDUCATION:   The Speech Language Pathologist (SLP) completed education regarding results of evaluation and that intervention is warranted at this time. Learner: Patient  Education: Reviewed results and recommendations of this evaluation and Reviewed diet and strategies  Evaluation of Education:  Dl Mirza understanding    This plan may be re-evaluated and revised as warranted. Evaluation Time includes thorough review of current medical information, gathering information on past medical history/social history and prior level of function, completion of standardized testing/informal observation of tasks, assessment of data and education on plan of care and goals.     [x]The admitting diagnosis and active problem list, have been reviewed prior to initiation of this evaluation.     CPT Code: 02407  dysphagia study    ACTIVE PROBLEM LIST:   Patient Active Problem List   Diagnosis    Type 2 diabetes mellitus without complication, without long-term current use of insulin ([facility] [address]    Acquired hypothyroidism    Pure hypercholesterolemia    Benign prostatic hyperplasia without lower urinary tract symptoms    TIA (transient ischemic attack)    Dizziness

## 2022-05-20 VITALS
OXYGEN SATURATION: 93 % | SYSTOLIC BLOOD PRESSURE: 134 MMHG | HEIGHT: 67 IN | RESPIRATION RATE: 18 BRPM | HEART RATE: 77 BPM | TEMPERATURE: 97.7 F | DIASTOLIC BLOOD PRESSURE: 65 MMHG | BODY MASS INDEX: 28.36 KG/M2 | WEIGHT: 180.7 LBS

## 2022-05-20 PROCEDURE — 97530 THERAPEUTIC ACTIVITIES: CPT

## 2022-05-20 PROCEDURE — 99239 HOSP IP/OBS DSCHRG MGMT >30: CPT | Performed by: INTERNAL MEDICINE

## 2022-05-20 PROCEDURE — 92526 ORAL FUNCTION THERAPY: CPT

## 2022-05-20 PROCEDURE — 6370000000 HC RX 637 (ALT 250 FOR IP): Performed by: STUDENT IN AN ORGANIZED HEALTH CARE EDUCATION/TRAINING PROGRAM

## 2022-05-20 PROCEDURE — 97535 SELF CARE MNGMENT TRAINING: CPT

## 2022-05-20 PROCEDURE — 6370000000 HC RX 637 (ALT 250 FOR IP): Performed by: INTERNAL MEDICINE

## 2022-05-20 RX ORDER — ATORVASTATIN CALCIUM 40 MG/1
40 TABLET, FILM COATED ORAL NIGHTLY
Qty: 30 TABLET | Refills: 3 | Status: SHIPPED | OUTPATIENT
Start: 2022-05-20 | End: 2022-11-04 | Stop reason: SDUPTHER

## 2022-05-20 RX ORDER — CLOPIDOGREL BISULFATE 75 MG/1
75 TABLET ORAL DAILY
Qty: 30 TABLET | Refills: 3 | Status: SHIPPED | OUTPATIENT
Start: 2022-05-20 | End: 2022-09-13 | Stop reason: SDUPTHER

## 2022-05-20 RX ADMIN — GLIPIZIDE 5 MG: 5 TABLET ORAL at 06:31

## 2022-05-20 RX ADMIN — Medication 1000 UNITS: at 08:24

## 2022-05-20 RX ADMIN — Medication 1000 MCG: at 08:23

## 2022-05-20 RX ADMIN — METOPROLOL SUCCINATE 12.5 MG: 25 TABLET, EXTENDED RELEASE ORAL at 08:24

## 2022-05-20 RX ADMIN — LEVOTHYROXINE SODIUM 88 MCG: 0.09 TABLET ORAL at 08:23

## 2022-05-20 RX ADMIN — ASPIRIN 81 MG 81 MG: 81 TABLET ORAL at 08:24

## 2022-05-20 RX ADMIN — CLOPIDOGREL BISULFATE 75 MG: 75 TABLET ORAL at 08:26

## 2022-05-20 NOTE — PROGRESS NOTES
Occupational Therapy  OT BEDSIDE TREATMENT NOTE   [facility] [address]  [address] [place] [address], [address] [place] [address] Rd  Patient Name: Samantha Santiago  MRN: [id number]  : 1934  Room: [address] [place] [address]     Per OT Eval:    Evaluating [address], OTR/L #[id number]     Referring Provider: Randall Gómez MD  Specific Provider Orders/Date: OT eval and treat 22     Diagnosis: TIA (transient ischemic attack) [G45.9]  Dizziness [R42]   Pt admitted to hospital on 22 for dizziness, weakness      Pertinent Medical History:  has a past medical history of Diabetes mellitus ([facility] [address] Dizziness, and Thyroid disease.         Precautions:  Fall Risk, cognition, bed alarm, TAPS     Assessment of current deficits    [x]? Functional mobility         [x]? ADLs           [x]? Strength                  [x]? Cognition    [x]? Functional transfers       [x]? IADLs         [x]? Safety Awareness   [x]? Endurance    []? Fine Coordination                      [x]? Balance      []? Vision/perception   []? Sensation      []? Gross Motor Coordination          []? ROM           []?  Delirium                   []? Motor Control      OT PLAN OF CARE   OT POC based on physician orders, patient diagnosis and results of clinical assessment     Frequency/Duration 1-3 days/wk for 2 weeks PRN   Specific OT Treatment Interventions to include:   * Instruction/training on adapted ADL techniques and AE recommendations to increase functional independence within precautions       * Training on energy conservation strategies, correct breathing pattern and techniques to improve independence/tolerance for self-care routine  * Functional transfer/mobility training/DME recommendations for increased independence, safety, and fall prevention  * Patient/Family education to increase follow through with safety techniques and functional independence  * Recommendation of environmental modifications for increased safety with functional transfers/mobility and ADLs  * Cognitive retraining/development of therapeutic activities to improve problem solving, judgement, memory, and attention for increased safety/participation in ADL/IADL tasks  * Therapeutic exercise to improve motor endurance, ROM, and functional strength for ADLs/functional transfers  * Therapeutic activities to facilitate/challenge dynamic balance, stand tolerance for increased safety and independence with ADLs  * Therapeutic activities to facilitate gross/fine motor skills for increased independence with ADLs        OTMRS   Modified Tibbie Scale (MRS)  Score     Description  0             No symptoms  1             No significant disability despite symptoms  2             Slight disability; able to look after own affairs  3             Moderate disability; able to ambulate without assist/ requires assist with ADLs  4             Moderate/Severe disability;requires assist to ambulate/assist with ADLs  5             Severe disability;bedridden/incontinent   6               Score:  4     Recommended Adaptive Equipment: TBD      Home Living: Pt lives alone in 2 floor home. 0 TG  Bath and bedroom on 2nd floor - flight of stairs    Bathroom setup: walk in shower (2nd floor)    Equipment owned: n/a     Prior Level of Function: independent with ADLs , independent with IADLs; ambulated w/o AD   Driving: yes     Pain Level: Pt did not complain of pain this session     Cognition: A&O: 3/4; Follows 1 step directions  Lethargic. Delayed processing speed.  Flat affect           Memory:  fair            Sequencing:  fair            Problem solving:  poor           Judgement/safety:  poor             Functional Assessment:  AM-PAC Daily Activity Raw Score:     Initial Eval Status  Date: 22 Treatment Status  Date: 22 STGs = LTGs  Time frame: 10-14 days   Feeding NPO  DNT -   Grooming Moderate Assist   Washed hands and face  Setup  Pt washed face, applied deodorant seated EOB Supervision    UB Dressing Maximal Assist  Carly  Bereket/do hospital gown with assistance to manage of lines/tubes  Minimal Assist    LB Dressing Dependent   maxA  Pt donned pants, bending at hips to thread with assistance to bring over toes and stand to pull over hips    Pt donned/doffed socks bending at hips, with pt able to thread over toes, assistance to bring over heel and ankle Moderate Assist    Bathing Dependent  Carly  Simulated Task    Pt able to wash of UB, bending at hips to wash of LE's, able to wash of garrick area, with CGA to stand and wash of buttocks    Recommending of shower bench Minimal Assist    Toileting Dependent   Carly  Simulated Task    Pt completed transfer from standard commode with use of grab bar, simulating hygeine to buttocks, assistance to manage of pants  Moderate Assist    Bed Mobility  Supine to sit: Maximal Assist   Sit to supine: Dependent   Carly  Supine<>EOB    HOB slightly elevated, use of bed rail, assistance with trunk Supine to sit: Minimal Assist   Sit to supine: Minimal Assist    Functional Transfers Maximal Assist   CGA  Sit to Stand  Stand to Sit    Cueing for hand placement Minimal Assist    Functional Mobility NT  Deferred d/t poor standing tolerance  SBA  Pt ambulated around unit and within room with w.w. Minimal Assist    Balance Sitting:     Static:  Mod A>Max A    Dynamic:Max A  Increased fatigue noted as progressed - heavy posterior lean  Standing: Max A w/ w/w  Sitting EOB:  Carly  Posterior Lean    Standing:  CGA/SBA     Activity Tolerance Poor+  Fair- Fair+   Visual/  Perceptual Glasses: yes  No complaints verbalized.  Unable to accurately assess d/t lethargy                          Hand Dominance R    AROM (PROM) Strength Additional Info:    RUE  WFL 3-/5 good  and wfl FMC/dexterity noted during ADL tasks         LUE WFL 3-/5 good  and wfl FMC/dexterity noted during ADL tasks         Hearing: WFL   Sensation:  No c/o numbness or tingling   Tone: WFL   Edema: none noted        Education:  Pt was educated on role of OT, goals to be reached, importance of OOB activity, safety with bed mobility, safety and hand placement with transfers, safety and walker management with functional mobility, techniques to assist with LB dressing/bathing tasks, DME to assist with ADL tasks, energy conservation and deep breathing techniques. Comments: Upon arrival pt supine in bed, agreeable to therapy, visitors present, but leaving room during therapy session. Pt's visitors expressing concerns on pt's current assist levels and discharge plans. Pt compelted of bed mobility, functional mobility, transfers and ADL tasks this session. Rest breaks provided throughout session as needed. Monitoring of pt's O2 being within normal limits with and without oxygen. Recommending of shower bench for safety with bathing tasks if discharged home. At end of session, pt seated upright in chair, all lines and tubes intact, call light within reach. · Pt has made fair progress towards set goals.    · Continue with current plan of care focusing on increasing of independency with transfers and ADL tasks      Treatment Time In: 10:45am           Treatment Time Out: 11:15am               Treatment Charges: Mins Units   Ther Ex  [id number]     Manual Therapy 16450     Thera Activities [id number] 15 1   ADL/Home Mgt [id number] 15 1   Neuro Re-ed [id number]     Group Therapy      Orthotic manage/training  [id number]     Non-Billable Time     Total Timed Treatment 30 2        Janene GUNTER/L [id number]

## 2022-05-20 NOTE — CARE COORDINATION
Spoke with [facility], they accepted pt. [facility] orders under nursing communications. [place] choice, no staffing until Tuesday. Family in room, updated pt and family. Family to transport.  Pt discharging home with 2022 Mark Frias, MSW, LSW There are no Wet Read(s) to document.

## 2022-05-20 NOTE — PROGRESS NOTES
Pt seen for dysphagia   Tolerating current diet without noted difficulty.  Further therapy is not recommended

## 2022-05-20 NOTE — PLAN OF CARE
Problem: Chronic Conditions and Co-morbidities  Goal: Patient's chronic conditions and co-morbidity symptoms are monitored and maintained or improved  Outcome: Progressing  Flowsheets (Taken 5/19/2022 2030)  Care Plan - Patient's Chronic Conditions and Co-Morbidity Symptoms are Monitored and Maintained or Improved: Monitor and assess patient's chronic conditions and comorbid symptoms for stability, deterioration, or improvement     Problem: Discharge Planning  Goal: Discharge to home or other facility with appropriate resources  Outcome: Progressing  Flowsheets (Taken 5/19/2022 2030)  Discharge to home or other facility with appropriate resources: Identify barriers to discharge with patient and caregiver     Problem: ABCDS Injury Assessment  Goal: Absence of physical injury  Recent Flowsheet Documentation  Taken 5/19/2022 2112 by Arlene Libman, RN  Absence of Physical Injury: Implement safety measures based on patient assessment

## 2022-05-20 NOTE — PATIENT CARE CONFERENCE
P Quality Flow/Interdisciplinary Rounds Progress Note        Quality Flow Rounds held on May 20, 2022    Disciplines Attending:  Bedside Nurse, ,  and Nursing Unit Leadership    Alisha Mcdaniels was admitted on 5/16/2022  9:34 PM    Anticipated Discharge Date:  Expected Discharge Date: 05/20/22    Disposition:    Micha Score:  Micha Scale Score: 19    Readmission Risk              Risk of Unplanned Readmission:  8           Discussed patient goal for the day, patient clinical progression, and barriers to discharge.   The following Goal(s) of the Day/Commitment(s) have been identified:  Diagnostics - Report Results      Arsalan Souza RN  May 20, 2022

## 2022-05-20 NOTE — PROGRESS NOTES
Admit Date: 5/16/2022    Subjective:     Feels fine no complaint tolerating diet walked fine with walker  Want to go home declined rehab. Objective:     Patient Vitals for the past 8 hrs:   BP Temp Temp src Pulse Resp SpO2   05/20/22 0013 124/81 97.9 °F (36.6 °C) Temporal 89 20 95 %     I/O last 3 completed shifts: In: 3527.8 [P.O.:420; I.V.:3107.8]  Out: 1180 [Urine:1180]  No intake/output data recorded. HEENT: Normal  NECK: Thyroid normal. No carotid bruit. No lymphphadenopathy. CVS: RRR  RS: Clear. No wheeze. No rhonchi. Good airflow bilaterally. ABD: Soft. Non tender. No mass. Normal BS. EXT: No edema. Non tender. Pulses present.    NEURO: no focal deficit       Scheduled Meds:   clopidogrel  75 mg Oral Daily    atorvastatin  40 mg Oral Nightly    aspirin  81 mg Oral Daily    glipiZIDE  5 mg Oral QAM AC    levothyroxine  88 mcg Oral Daily    metoprolol succinate  12.5 mg Oral Daily    vitamin B-12  1,000 mcg Oral Daily    Vitamin D  1,000 Units Oral Daily     Continuous Infusions:   dextrose 5 % and 0.9 % NaCl 75 mL/hr at 05/20/22 0643       CBC with Differential:    Lab Results   Component Value Date    WBC 7.1 05/16/2022    RBC 4.92 05/16/2022    HGB 15.0 05/16/2022    HCT 43.9 05/16/2022     05/16/2022    MCV 89.2 05/16/2022    MCH 30.5 05/16/2022    MCHC 34.2 05/16/2022    RDW 13.2 05/16/2022    LYMPHOPCT 16.9 05/16/2022    MONOPCT 9.7 05/16/2022    EOSPCT 1.7 06/08/2021    BASOPCT 0.6 05/16/2022    MONOSABS 0.69 05/16/2022    LYMPHSABS 1.20 05/16/2022    EOSABS 0.02 05/16/2022    BASOSABS 0.04 05/16/2022     CMP:    Lab Results   Component Value Date     05/16/2022    K 3.7 05/16/2022     05/16/2022    CO2 18 05/16/2022    BUN 11 05/16/2022    CREATININE 1.4 05/16/2022    GFRAA 58 05/16/2022    LABGLOM 48 05/16/2022    PROT 7.1 05/16/2022    LABALBU 4.1 05/16/2022    LABALBU 4.0 02/28/2011    CALCIUM 8.7 05/16/2022    BILITOT 0.5 05/16/2022    ALKPHOS 92 05/16/2022 AST 25 05/16/2022    ALT 20 05/16/2022     PT/INR:    Lab Results   Component Value Date    PROTIME 13.2 02/28/2011    INR 1.4 02/28/2011       Assessment:     Principal Problem:    Dizziness with recurrent fall   TIA   DM  HTN  Hypothyroid   Dysphagia resolved       Plan:   Stable for discharge

## 2022-05-20 NOTE — PLAN OF CARE
[facility]  Neurology Consult    Date:  5/20/2022  Patient Name:  Mignon Carlson  YOB: 1934  MRN: [id number]     Neurology following for TIA    PMH of T2DM, BPH, HLD     Assessment  TIA   --- LDL 43 and A1c 6.3  --- stroke risk factors include: HLD , DM  --- ECHO negative and MRI brain was negative for acute findings      Plan  · DAPT for 21 days and then plavix monotherapy  · Continue atorvastatin 40 mg daily  · Follow up with stroke clinic  · Neurology to sign off

## 2022-05-20 NOTE — ADT AUTH CERT
Transient Ischemic Attack (TIA) - Care Day 4 (5/19/2022) by Edna Forrester RN       Review Status Review Entered   Completed 5/20/2022 13:32      Criteria Review      Care Day: 4 Care Date: 5/19/2022 Level of Care: Intermediate Care    Guideline Day 2    Clinical Status    (X) * Hemodynamic stability    5/20/2022 1:32 PM EDT by Dejah Sarmiento      140/77  97p    ( ) * Mental status at baseline    ( ) * Dangerous arrhythmia absent    ( ) * Resolution of presenting signs and symptoms    (X) * No TIA recurrence or new neurologic deficit    5/20/2022 1:32 PM EDT by Dejah Sarmiento      absent    ( ) * Etiology requiring inpatient treatment absent    (X) * No bleeding secondary to antithrombotic medication    5/20/2022 1:32 PM EDT by Dejah Sarmiento      absent    ( ) * Discharge plans and education understood    Activity    (X) * Ambulatory or acceptable for next level of care    5/20/2022 1:32 PM EDT by Eliezer Baker walked 400 feet with a walker    Routes    (X) * Oral hydration    5/20/2022 1:32 PM EDT by Dejah Sarmiento      PO after swallow study  dextrose 5 % and 0.9 % sodium chloride infusion  Rate: 75 mL/hr Freq: CONTINUOUS Route: IV    (X) * Oral medications or regimen acceptable for next level of care    5/20/2022 1:32 PM EDT by Dejah Sarmiento      lipitor 40mg qhs PO    (X) * Oral diet or acceptable for next level of care    5/20/2022 1:32 PM EDT by Dejah Sarmiento      reg PO diet after swallow study    Medications    ( ) * Antithrombotic therapy appropriate for next level of care established    * Milestone   Additional Notes   DATE:    05/19      Pertinent Updates:   Video swallow study, ECHO, D/C planning      Vitals:   98.2f, 20r, 97p, 144/85, 95% 3L Nasal cannula      Abnl/Pertinent Labs/Radiology/Diagnostic Studies:   ECHO   Normal left ventricle size and systolic function.    Ejection fraction is visually estimated at 55-60%.    No regional wall motion abnormalities seen.    Moderate concentric left ventricular hypertrophy.  Stage I diastolic dysfunction.    No evidence of patent foramen ovale on bubble study.    Mildly dilated right ventricle.    Right ventricle global systolic function is normal .    No hemodynamically significant aortic stenosis is present.    Mild aortic regurgitation is noted.    Physiologic and/or trace tricuspid regurgitation.  RVSP is 30 mmHg. Normal estimated PA systolic pressure.    Mildly dilated aortic root (4.3 cm).    No evidence for hemodynamically significant pericardial effusion. PT/OT/SLP/CM Assessments or Notes:   Video Swallow Study   DYSPHAGIA DIAGNOSIS:  mild-moderate pharyngeal phase dysphagia        DIET RECOMMENDATIONS:  Regular consistency solids (IDDSI level 7) with  thin liquids (IDDSI level 0)       FEEDING RECOMMENDATIONS:               Assistance level:  No assistance needed                  Compensatory strategies recommended: Double swallow and Small bites/sips      CM   [facility], updated family they would like [facility], pt is declining to go to [facility], expressed concern for his safety, explained several times that he is to weak to go home alone, reminded him it will be short term, then home. Pt will think about this overnight, will follow up in morning.       No provider notes available        Transient Ischemic Attack (TIA) - Care Day 3 (5/18/2022) by Scott Ramírez RN       Review Status Review Entered   Completed 5/19/2022 15:06      Criteria Review      Care Day: 3 Care Date: 5/18/2022 Level of Care: Intermediate Care    Guideline Day 1    Level Of Care    (X) Floor, intermediate care, or telemetry    5/19/2022 3:06 PM EDT by Waylon Gibbs      INTERMEDIATE    Clinical Status    ( ) * Clinical Indications met    5/19/2022 3:06 PM EDT by Margarito King VITALS:T 98.2; P 86; RR 20; /78; PULSE OX 94 % 3 L NC    Medications    (X) Antiplatelet therapy    4/49/7839 3:06 PM EDT by Waylon Gibbs      PLAVIX    * Milestone   Additional Notes DATE: 5/18/2022   Abnl/Pertinent Labs/Radiology/Diagnostic Studies:   5/18/2022 08:56   Hemoglobin A1C: 6.3 (H)      MRI OF THE BRAIN WITHOUT CONTRAST:       1.  No acute intracranial abnormality. 2. 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 3. Moderate volume loss is seen in the brain with mild-to-moderate chronic   microvascular ischemic changes. MRA OF THE BRAIN WITHOUT CONTRAST:   1. No major arterial stenosis or occlusion. 2. Vertebrobasilar dolichoectasia. MRA OF THE NECK WITHOUT CONTRAST:   1. No significant stenosis seen in the visualized carotid or vertebral   arteries. Physical Exam:   HEENT: Normal   NECK: Thyroid normal. No carotid bruit. CVS: RRR   RS: Clear. No wheeze. No rhonchi. ABD: Soft. Non tender. No mass. Normal BS. EXT: No edema. Non tender. Pulses present.     NEURO: moving all extremities       MD Consults/Assessments & Plans:   * * INTERNAL MEDICINE * *    Assessment:       Principal Problem:    Dizziness with recurrent fall    TIA    DM   HTN   Hypothyroid    Dysphagia        Plan:   Continue IV fluid ,neuro evaluation ,need waiver for feeding          * *NEUROLOGY * *    Assessment   Audrey Umana is a 80 y.o. male who presented with dysarthria, dysphagia and left beating nystagmus concerning for stroke.           Plan   · MRI brain, MRA head and neck   · F/u Echo   · Start DAPT for 21 days and then plavix monotherapy   · Continue atorvastatin 40 mg daily   · Neurology will follow   · PT/OT eval               Medications:   aspirin chewable tablet 81 mg   Dose: 81 mg   Freq: DAILY Route: PO      atorvastatin (LIPITOR) tablet 40 mg   Dose: 40 mg   Freq: NIGHTLY Route: PO      clopidogrel (PLAVIX) tablet 75 mg   Dose: 75 mg   Freq: DAILY Route: PO      dextrose 5 % and 0.9 % sodium chloride infusion   Rate: 75 mL/hr Freq: CONTINUOUS Route: IV      glipiZIDE (GLUCOTROL) tablet 5 mg   Dose: 5 mg   Freq: DAILY BEFORE BREAKFAST Route: PO      levothyroxine (SYNTHROID) tablet 88 mcg   Dose: 88 mcg   Freq: DAILY Route: PO      metoprolol succinate (TOPROL XL) extended release tablet 12.5 mg   Dose: 12.5 mg   Freq: DAILY Route: PO      vitamin B-12 (CYANOCOBALAMIN) tablet 1,000 mcg   Dose: 1,000 mcg   Freq: DAILY Route: PO      Vitamin D (CHOLECALCIFEROL) tablet 1,000 Units   Dose: 1,000 Units   Freq: DAILY Route: PO

## 2022-05-20 NOTE — CARE COORDINATION
PT/OT updates today, pt walked 200 ft with fww and 4 steps. Pt now to go home, family upset they feel pt should go somewhere. Explained that pt is doing well enough at home and insurance will not cover [facility]. Gave list of [facility] agencies, 1. [place] choice 2. [place] are their choices. Referral to [facility], await assessment and staffing. Gave list of private pay caregivers to family. Pt would benefit from a w/w, cousins in room state that they have a walker for pt. Family to transport home. 1:20pm attempted to reach [facility] choice, unable to reach her. Referral to Dejah Stringer via message, await assessment. Moon Coker, MSW, LSW  . The Plan for Transition of Care is related to the following treatment goals: The Patient and/or patient representative Agatha Hall was provided with a choice of provider and agrees   with the discharge plan. [x] Yes [] No    Freedom of choice list was provided with basic dialogue that supports the patient's individualized plan of care/goals, treatment preferences and shares the quality data associated with the providers.  [x] Yes [] No

## 2022-05-20 NOTE — PROGRESS NOTES
Physical Therapy  Physical Therapy Daily Treatment Note      Name: Srinath Kay  : 1934  MRN: [id number]      Date of Service: 2022    Evaluating PT:  Shaista Alicealock, PT, DPT [id number]    Room #:  7559/5117-Y  Diagnosis:  TIA (transient ischemic attack) [G45.9]  Dizziness [R42]  PMHx/PSHx:  Dizziness, thyroid disease, DM  Procedure/Surgery:  None  Precautions:  Falls, O2  Equipment Needs:  TBD  Equipment Owned:  Cane, walker    SUBJECTIVE:    Pt lives alone in a 2 story home + basement with 1 stair(s) to enter and 0 rail(s). Bed is on 2nd floor and bath is on 2nd floor. There is a full flight of stairs with 2 rails to basement; full flight of stairs with 1 rail + structural supports that patient reports holding onto to 2nd floor. Pt ambulated with no AD PTA. OBJECTIVE:   Initial Evaluation  Date: 2022 Treatment  22 Short Term/ Long Term   Goals   AM-PAC 6 Clicks 55/40 92/79    Was pt agreeable to Eval/treatment? Yes Yes    Does pt have pain? No None reported     Bed Mobility  Rolling: NT  Supine to sit: [place]  Sit to supine: [place]  Scooting: [place] (seated) Rolling: NT  Supine to sit: Min A  Sit to supine: NT  Scooting: NT Rolling: Independent  Supine to sit:  Independent  Sit to supine: Independent  Scooting: Independent   Transfers Sit to stand: [address]  Stand to sit: ModA  Stand pivot: NT Sit to stand: SBA  Stand to sit: SBA  Stand pivot: SBA with FWW Sit to stand: Susan  Stand to sit: Susan  Stand pivot: Susan with AAD   Ambulation    3 feet with no AD MaxA 200 feet with FWW x2 reps  feet with AAD Susan   Stair negotiation: ascended and descended  NT 4 steps B rails CGA 12 step(s) with 2 rail(s) Susan   ROM BUE:  See OT note  BLE:  WFL WFL    Strength BUE:  See OT note  BLE:  Grossly 5/5 BLE 5/5     Balance Sitting EOB:  Supervision  Dynamic Standing:  MaxA with no AD Sitting EOB:  SBA  Dynamic Standing:  SBA with FWW  Sitting EOB:  Independent  Dynamic Standing:  Susan with AAD     Pt is A & O x 4  Sensation:  Pt denies numbness and tingling to extremities  Edema:  Unremarkable    Vitals:   SpO2 97-98% on 3 L during ambulation   SpO2 93-94% on RA during ambulation     Patient education  Pt educated on continued use of [address]    Patient response to education:   Pt verbalized understanding Pt demonstrated skill Pt requires further education in this area   Yes Yes Yes     ASSESSMENT:    Conditions Requiring Skilled Therapeutic Intervention:    [x]Decreased strength     []Decreased ROM  [x]Decreased functional mobility  [x]Decreased balance   [x]Decreased endurance   [x]Decreased posture  []Decreased sensation  []Decreased coordination   []Decreased vision  [x]Decreased safety awareness   []Increased pain       Comments:   Pt with OT on arrival and agreeable to PT session. Pt able to ambulate with slow but steady gait using FWW.  SpO2 WNL during ambulation. Pt completed stair negotiation with B rails. Pt ambulated back to room and assisted on/off toilet. Ambulated again on RA for walking pulse oximetry. Pt did not desaturate. Pt in bedside recliner chair on exit. Pt encouraged to continue use of FWW. Treatment:  Patient practiced and was instructed in the following treatment:    Gait training- pt was given verbal and tactile cues to facilitate safety/balance during ambulation as well as provided with physical assistance. Stair training - Pt educated on proper safety and sequencing during stair ascension and descension. Verbal cues were given to facilitate safety/balance and hands on assistance provided for balance and fall prevention. PHYSICAL THERAPY PLAN OF CARE:  Pt is making progress towards established goals. Continue PT POC. Specific instructions for next treatment:  Continue to facilitate safe performance of functional mobility; progress as appropriate.       Time in  1050  Time out  1115    Total Treatment Time  25 minutes     CPT codes:  [] Low Complexity PT evaluation 33415  [] Moderate Complexity PT evaluation 02411  [] High Complexity PT evaluation 41075  [] PT Re-evaluation 38395  [] Gait training 11995 0 minutes  [] Manual therapy 84384 0 minutes  [x] Therapeutic activities 40886 25 minutes  [] Therapeutic exercises 29755 0 minutes  [] Neuromuscular reeducation 51917 0 minutes     Jayy Man, PT, DPT  [id number]

## 2022-05-20 NOTE — PLAN OF CARE
Problem: Chronic Conditions and Co-morbidities  Goal: Patient's chronic conditions and co-morbidity symptoms are monitored and maintained or improved  Outcome: Completed     Problem: Discharge Planning  Goal: Discharge to home or other facility with appropriate resources  Outcome: Completed     Problem: Pain  Goal: Verbalizes/displays adequate comfort level or baseline comfort level  Outcome: Completed     Problem: Skin/Tissue Integrity  Goal: Absence of new skin breakdown  Description: 1. Monitor for areas of redness and/or skin breakdown  2. Assess vascular access sites hourly  3. Every 4-6 hours minimum:  Change oxygen saturation probe site  4. Every 4-6 hours:  If on nasal continuous positive airway pressure, respiratory therapy assess nares and determine need for appliance change or resting period.   Outcome: Completed

## 2022-05-21 NOTE — DISCHARGE SUMMARY
Discharge Summary    Date: 5/21/2022  Patient Name: Chelsey Crow YOB: 1934 Age: 80 y.o. Admit Date: 5/16/2022  Discharge Date: 5/20/2022  Discharge Condition: Stable    Admission Diagnosis  TIA (transient ischemic attack) (G45.9); Dizziness (R42)     Discharge Diagnosis  Principal Problem: TIA (transient ischemic attack)Active Problems: DizzinessResolved Problems: * No resolved hospital problems. [facility] Stay  Narrative of Hospital Course:  Admitted from ER with dizziness Ct scan no acute change seen by neurology treated conservatively failed swallowing test initially patient and family declined PEG repeat swallowing few days after was ok started on diet tolerated well stabilized discharged home     Consultants:  Yazan Martin [id number]    Surgeries/procedures Performed:       Treatments:           Discharge Plan/Disposition:  Home    Hospital/Incidental Findings Requiring Follow Up:    Patient Instructions:    Diet: Regular Diet    Activity:Activity as Tolerated  For number of days (if applicable): Other Instructions:    Provider Follow-Up:   No follow-ups on file.      Significant Diagnostic Studies:    Recent Labs:  Admission on 05/16/2022, Discharged on 05/20/2022WBC                                          Date: 05/16/2022Value: 7.1         Ref range: 4.5 - 11.5 E9/L    Status: FinalRBC                                           Date: 05/16/2022Value: 4.92        Ref range: 3.80 - 5.80 E12/L  Status: FinalHemoglobin                                    Date: 05/16/2022Value: 15.0        Ref range: 12.5 - 16.5 g/dL   Status: FinalHematocrit                                    Date: 05/16/2022Value: 43.9        Ref range: 37.0 - 54.0 %      Status: FinalMCV                                           Date: 05/16/2022Value: 89.2        Ref range: 80.0 - 99.9 fL     Status: [address] [facility]                                           Date: 05/16/2022Value: 30.5        Ref range: 26.0 - 35.0 pg     Status: [address]                                          Date: 05/16/2022Value: 34.2        Ref range: 32.0 - 34.5 %      Status: FinalRDW                                           Date: 05/16/2022Value: 13.2        Ref range: 11.5 - 15.0 fL     Status: FinalPlatelets                                     Date: 05/16/2022Value: 153         Ref range: 130 - 450 E9/L     Status: FinalMPV                                           Date: 05/16/2022Value: 10.5        Ref range: 7.0 - 12.0 fL      Status: FinalNeutrophils %                                 Date: 05/16/2022Value: 72.1        Ref range: 43.0 - 80.0 %      Status: FinalImmature Granulocytes %                       Date: 05/16/2022Value: 0.4         Ref range: 0.0 - 5.0 %        Status: FinalLymphocytes %                                 Date: 05/16/2022Value: 16.9*       Ref range: 20.0 - 42.0 %      Status: FinalMonocytes %                                   Date: 05/16/2022Value: 9.7         Ref range: 2.0 - 12.0 %       Status: FinalEosinophils %                                 Date: 05/16/2022Value: 0.3         Ref range: 0.0 - 6.0 %        Status: FinalBasophils %                                   Date: 05/16/2022Value: 0.6         Ref range: 0.0 - 2.0 %        Status: FinalNeutrophils Absolute                          Date: 05/16/2022Value: 5.11        Ref range: 1.80 - 7.30 E9/L   Status: FinalImmature Granulocytes #                       Date: 05/16/2022Value: 0.03        Ref range: E9/L               Status: FinalLymphocytes Absolute                          Date: 05/16/2022Value: 1.20*       Ref range: 1.50 - 4.00 E9/L   Status: FinalMonocytes Absolute                            Date: 05/16/2022Value: 0.69        Ref range: 0.10 - 0.95 E9/L   Status: FinalEosinophils Absolute                          Date: 05/16/2022Value: 0.02*       Ref range: 0.05 - 0.50 E9/L   Status: FinalBasophils Absolute                            Date: 05/16/2022Value: 0.04        Ref range: 0.00 - 0.20 E9/L   Status: FinalSodium                                        Date: 05/16/2022Value: 138         Ref range: 132 - 146 mmol/L   Status: FinalPotassium                                     Date: 05/16/2022Value: 3.7         Ref range: 3.5 - 5.0 mmol/L   Status: Final              Comment: Specimen is moderately Hemolyzed. Result may be artificially increased. Chloride                                      Date: 05/16/2022Value: 108*        Ref range: 98 - 107 mmol/L    Status: FinalCO2                                           Date: 05/16/2022Value: 18*         Ref range: 22 - 29 mmol/L     Status: FinalAnion Gap                                     Date: 05/16/2022Value: 12          Ref range: 7 - 16 mmol/L      Status: FinalGlucose                                       Date: 05/16/2022Value: 162*        Ref range: 74 - 99 mg/dL      Status: FinalBUN                                           Date: 05/16/2022Value: 11          Ref range: 6 - 23 mg/dL       Status: FinalCREATININE                                    Date: 05/16/2022Value: 1.4*        Ref range: 0.7 - 1.2 mg/dL    Status: FinalGFR Non-                      Date: 05/16/2022Value: 48          Ref range: >=60 mL/min/1.73   Status: Final              Comment: Chronic Kidney Disease: less than 60 ml/min/1.73 sq.m. Kidney Failure: less than 15 ml/min/1.73 sq. m. Results valid for patients 18 years and older. GFR                           Date: 05/16/2022Value: 62            Status: FinalCalcium                                       Date: 05/16/2022Value: 8.7         Ref range: 8.6 - 10.2 mg/dL   Status: FinalTotal Protein                                 Date: 05/16/2022Value: 7.1         Ref range: 6.4 - 8.3 g/dL     Status: FinalAlbumin                                       Date: 05/16/2022Value: 4.1         Ref range: 3.5 - 5.2 g/dL     Status: FinalTotal Bilirubin                               Date: 05/16/2022Value: 0.5         Ref range: 0.0 - 1.2 mg/dL    Status: FinalAlkaline Phosphatase                          Date: 05/16/2022Value: 92          Ref range: 40 - 129 U/L       Status: FinalALT                                           Date: 05/16/2022Value: 20          Ref range: 0 - 40 U/L         Status: FinalAST                                           Date: 05/16/2022Value: 25          Ref range: 0 - 39 U/L         Status: Final              Comment: Specimen is moderately Hemolyzed. Result may be artificially increased. Color, UA                                     Date: 05/16/2022Value: Yellow      Ref range: Straw/Yellow       Status: FinalClarity, UA                                   Date: 05/16/2022Value: Clear       Ref range: Clear              Status: FinalGlucose, Ur                                   Date: 05/16/2022Value: Negative    Ref range: Negative mg/dL     Status: FinalBilirubin Urine                               Date: 05/16/2022Value: Negative    Ref range: Negative           Status: FinalKetones, Urine                                Date: 05/16/2022Value: Negative    Ref range: Negative mg/dL     Status: FinalSpecific Gravity, UA                          Date: 05/16/2022Value: >=1.030     Ref range: 1.005 - 1.030      Status: FinalBlood, Urine                                  Date: 05/16/2022Value: MODERATE*   Ref range: Negative           Status: FinalpH, UA                                        Date: 05/16/2022Value: 5.5         Ref range: 5.0 - 9.0          Status: FinalProtein, UA                                   Date: 05/16/2022Value: TRACE       Ref range: Negative mg/dL     Status: FinalUrobilinogen, Urine                           Date: 05/16/2022Value: 0.2         Ref range: <2.0 E.U./dL       Status: FinalNitrite, Urine                                Date: 05/16/2022Value: Negative    Ref range: Negative           Status: FinalLeukocyte Esterase, Urine                     Date: 05/16/2022Value: Negative    Ref range: Negative           Status: FinalVentricular Rate                              Date: 05/16/2022Value: 93          Ref range: BPM                Status: FinalAtrial Rate                                   Date: 05/16/2022Value: 93          Ref range: BPM                Status: FinalP-R Interval                                  Date: 05/16/2022Value: 192         Ref range: ms                 Status: FinalQRS Duration                                  Date: 05/16/2022Value: 100         Ref range: ms                 Status: FinalQ-T Interval                                  Date: 05/16/2022Value: 374         Ref range: ms                 Status: FinalQTc Calculation (Bazett)                      Date: 05/16/2022Value: 465         Ref range: ms                 Status: FinalP Axis                                        Date: 05/16/2022Value: 26          Ref range: degrees            Status: FinalR Axis                                        Date: 05/16/2022Value: -64         Ref range: degrees            Status: FinalT Axis                                        Date: 05/16/2022Value: 18          Ref range: degrees            Status: [address] [facility]                                       Date: 05/16/2022Value: NONE        Ref range: 0 - 5 /HPF         Status: FinalRBC, UA                                       Date: 05/16/2022Value: 10-20*      Ref range: 0 - 2 /HPF         Status: FinalBacteria, UA                                  Date: 05/16/2022Value: RARE*       Ref range: None Seen /HPF     Status: FinalTroponin, High Sensitivity                    Date: 05/16/2022Value: 15*         Ref range: 0 - 11 ng/L        Status: Final              Comment: High Sensitivity Troponin values cannot be compared withother Troponin methodologies. Patients with high levels of Biotin oral intake (i.e. >5 mg/day)may have falsely decreased Troponin levels. Samples collectedwithin 8 hours of biotin intake may require additional informationfor diagnosis. TSH                                           Date: 05/17/2022Value: 1.180       Ref range: 0.270 - 4.200 uI*  Status: FinalCholesterol, Total                            Date: 05/18/2022Value: 100         Ref range: 0 - 199 mg/dL      Status: FinalTriglycerides                                 Date: 05/18/2022Value: 60          Ref range: 0 - 149 mg/dL      Status: FinalHDL                                           Date: 05/18/2022Value: 45          Ref range: >40 mg/dL          Status: FinalLDL Calculated                                Date: 05/18/2022Value: 43          Ref range: 0 - 99 mg/dL       Status: FinalVLDL Cholesterol Calculated                   Date: 05/18/2022Value: 12          Ref range: mg/dL              Status: FinalHemoglobin A1C                                Date: 05/18/2022Value: 6.3*        Ref range: 4.0 - 5.6 %        Status: Final------------    Radiology last 7 days:  CT HEAD WO CONTRASTResult Date: 5/16/2022No acute intracranial abnormality. Periventricular white matter changes consistent with chronic microvascular disease. Diffuse volume loss. MRA HEAD WO CONTRASTResult Date: 5/18/2022MRI OF THE BRAIN WITHOUT CONTRAST: 1. No acute intracranial abnormality. 2. 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 3. Moderate volume loss is seen in the brain with mild-to-moderate chronic microvascular ischemic changes. MRA OF THE BRAIN WITHOUT CONTRAST: 1. No major arterial stenosis or occlusion. 2. Vertebrobasilar dolichoectasia. MRA OF THE NECK WITHOUT CONTRAST: 1. No significant stenosis seen in the visualized carotid or vertebral arteries. RECOMMENDATIONS: Unavailable XR CHEST PORTABLEResult Date: 5/16/2022No acute cardiopulmonary findings.   PA and lateral views would be useful for further assessment, if symptoms persist. MRA NECK WO CONTRASTResult Date: 5/18/2022MRI OF THE BRAIN WITHOUT CONTRAST: 1. No acute intracranial abnormality. 2. 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 3. Moderate volume loss is seen in the brain with mild-to-moderate chronic microvascular ischemic changes. MRA OF THE BRAIN WITHOUT CONTRAST: 1. No major arterial stenosis or occlusion. 2. Vertebrobasilar dolichoectasia. MRA OF THE NECK WITHOUT CONTRAST: 1. No significant stenosis seen in the visualized carotid or vertebral arteries. RECOMMENDATIONS: Unavailable MRI BRAIN WO CONTRASTResult Date: 5/18/2022MRI OF THE BRAIN WITHOUT CONTRAST: 1. No acute intracranial abnormality. 2. 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 3. Moderate volume loss is seen in the brain with mild-to-moderate chronic microvascular ischemic changes. MRA OF THE BRAIN WITHOUT CONTRAST: 1. No major arterial stenosis or occlusion. 2. Vertebrobasilar dolichoectasia. MRA OF THE NECK WITHOUT CONTRAST: 1. No significant stenosis seen in the visualized carotid or vertebral arteries. RECOMMENDATIONS: Unavailable Fluoroscopy modified barium swallow with videoResult Date: 5/19/2022Laryngeal penetration with thin consistency barium. No evidence of aspiration with any tested consistency of barium. Please see separate speech pathology report for full discussion of findings and recommendations. FL MODIFIED BARIUM Donnia Serum Date: 5/17/2022Positive examination for aspiration with nectar and pudding textures. Please see separate speech pathology report for full discussion of findings and recommendations.       [unfilled]    Discharge Medications    Discharge Medication List as of 5/20/2022  2:23 PMSTART taking these medicationsatorvastatin (LIPITOR) 40 MG tabletTake 1 tablet by mouth nightly, Disp-30 tablet, R-3Normalclopidogrel (PLAVIX) 75 MG tabletTake 1 tablet by mouth daily, Disp-30 tablet, R-3Normal    Discharge Medication List as of 5/20/2022  2:23 PM    Discharge Medication List as of 5/20/2022  2:23 PMCONTINUE these medications which have NOT CHANGEDlevothyroxine (SYNTHROID) 88 MCG tabletTake 1 tablet by mouth daily, Disp-90 tablet, R-1NormalglipiZIDE (GLUCOTROL XL) 5 MG extended release tabletTake 1 tablet by mouth daily, Disp-90 tablet, R-1Normalmetoprolol succinate (TOPROL XL) 25 MG extended release tabletTake 0.5 tablets by mouth daily Pt takes 1/2 tablet daily, Disp-90 tablet, R-1NormalVitamin D (CHOLECALCIFEROL) 1000 UNITS CAPS capsuleTake 1,000 Units by mouth daily. Historical Medvitamin B-12 (CYANOCOBALAMIN) 1000 MCG tabletTake 1,000 mcg by mouth daily. Historical Medaspirin 81 MG tabletTake 81 mg by mouth daily. Historical Med    Discharge Medication List as of 5/20/2022  2:23 PM    Time Spent on Discharge:3E] minutes were spent in patient examination, evaluation, counseling as well as medication reconciliation, prescriptions for required medications, discharge plan, and follow up.     Electronically signed by Lito Boss MD on 5/21/22 at 8:06 AM EDT

## 2022-06-27 ENCOUNTER — TELEPHONE (OUTPATIENT)
Dept: ADMINISTRATIVE | Age: 87
End: 2022-06-27

## 2022-06-27 NOTE — TELEPHONE ENCOUNTER
Patient scheduled with Dr. Jeremi Nuñez 6/28/22    Electronically signed by Gilbert Sky on 6/27/22 at 10:33 AM EDT

## 2022-06-27 NOTE — TELEPHONE ENCOUNTER
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 He said since waking up this am, he is unable to hear out of that ear. Please advise on scheduling.

## 2022-06-28 ENCOUNTER — OFFICE VISIT (OUTPATIENT)
Dept: ENT CLINIC | Age: 87
End: 2022-06-28
Payer: MEDICARE

## 2022-06-28 ENCOUNTER — PROCEDURE VISIT (OUTPATIENT)
Dept: AUDIOLOGY | Age: 87
End: 2022-06-28
Payer: MEDICARE

## 2022-06-28 VITALS
SYSTOLIC BLOOD PRESSURE: 132 MMHG | BODY MASS INDEX: 25.9 KG/M2 | DIASTOLIC BLOOD PRESSURE: 82 MMHG | HEART RATE: 92 BPM | HEIGHT: 67 IN | WEIGHT: 165 LBS

## 2022-06-28 DIAGNOSIS — H90.3 SENSORINEURAL HEARING LOSS (SNHL) OF BOTH EARS: Primary | ICD-10-CM

## 2022-06-28 DIAGNOSIS — H61.21 IMPACTED CERUMEN OF RIGHT EAR: Primary | ICD-10-CM

## 2022-06-28 DIAGNOSIS — H61.23 BILATERAL IMPACTED CERUMEN: ICD-10-CM

## 2022-06-28 PROCEDURE — 1123F ACP DISCUSS/DSCN MKR DOCD: CPT | Performed by: OTOLARYNGOLOGY

## 2022-06-28 PROCEDURE — 99204 OFFICE O/P NEW MOD 45 MIN: CPT | Performed by: OTOLARYNGOLOGY

## 2022-06-28 PROCEDURE — 69210 REMOVE IMPACTED EAR WAX UNI: CPT | Performed by: OTOLARYNGOLOGY

## 2022-06-28 PROCEDURE — 92567 TYMPANOMETRY: CPT | Performed by: AUDIOLOGIST

## 2022-06-28 ASSESSMENT — ENCOUNTER SYMPTOMS
COLOR CHANGE: 0
EYE PAIN: 0
ABDOMINAL PAIN: 0
DIARRHEA: 0
CHEST TIGHTNESS: 0
EYE DISCHARGE: 0
GASTROINTESTINAL NEGATIVE: 1
SHORTNESS OF BREATH: 0
EYES NEGATIVE: 1
APNEA: 0
RESPIRATORY NEGATIVE: 1
VOMITING: 0

## 2022-06-28 NOTE — PROGRESS NOTES
This patient was referred for audiometric/tympanometric testing by Dr. Luna Castelan due to sudden hearing loss right ear 4 days ago. States has had hearing test at Dr. Chapito Leblanc office in past and was told he had 45% hearing loss bilaterally. Thurmon Duos showed cerumen bilaterally, worse in the right ear. Tympanometry revealed normal middle ear peak pressure and compliance, in the left ear and flat tympanogram with reduced physical volume in the right ear. The results were reviewed with the patient. Recommendations for follow up will be made pending physician consult.     Deann Triplett

## 2022-06-28 NOTE — PROGRESS NOTES
Subjective:     Patient ID:  Chelsey Crow is a 80 y.o. male. HPI:    Hearing Loss  Patient presents today with complaints of hearing loss. Concern regarding hearing has been present for 2 weeks. He has not failed a prior hearing test.The patient reports talking loudly, difficulty hearing. Patient is currently wearing a hearing aid in the bilateral ear. History of Head trauma: no   Description: none  History of surgery to the head/neck: no   Description:none  History of cerumen impaction: yes  History of noise exposure: yes   Type: dentist  Spinning: no   Length of time:   Hearing loss: yes    Fluctuating: no  Aural pressure: no  Tinnitus:no  Otalgia:no    Past Medical History:   Diagnosis Date    Diabetes mellitus ([facility] [address]     Dizziness     Thyroid disease      Past Surgical History:   Procedure Laterality Date    CATARACT REMOVAL      CHOLECYSTECTOMY      COLONOSCOPY      PROSTATE SURGERY       Family History   Problem Relation Age of Onset    Diabetes Mother      Social History     Socioeconomic History    Marital status: Single     Spouse name: None    Number of children: None    Years of education: None    Highest education level: None   Occupational History    None   Tobacco Use    Smoking status: Never Smoker    Smokeless tobacco: Never Used   Substance and Sexual Activity    Alcohol use: No    Drug use: No    Sexual activity: None   Other Topics Concern    None   Social History Narrative    None     Social Determinants of Health     Financial Resource Strain:     Difficulty of Paying Living Expenses: Not on file   Food Insecurity:     Worried About Running Out of Food in the Last Year: Not on file    Nathalie of Food in the Last Year: Not on file   Transportation Needs:     Lack of Transportation (Medical): Not on file    Lack of Transportation (Non-Medical):  Not on file   Physical Activity:     Days of Exercise per Week: Not on file    Minutes of Exercise per Session: Not on file   Stress:     Feeling of Stress : Not on file   Social Connections:     Frequency of Communication with Friends and Family: Not on file    Frequency of Social Gatherings with Friends and Family: Not on file    Attends Confucianist Services: Not on file    Active Member of Clubs or Organizations: Not on file    Attends Club or Organization Meetings: Not on file    Marital Status: Not on file   Intimate Partner Violence:     Fear of Current or Ex-Partner: Not on file    Emotionally Abused: Not on file    Physically Abused: Not on file    Sexually Abused: Not on file   Housing Stability:     Unable to Pay for Housing in the Last Year: Not on file    Number of [place] in the Last Year: Not on file    Unstable Housing in the Last Year: Not on file     No Known Allergies      Review of Systems   Constitutional: Negative. Negative for appetite change. HENT: Positive for ear pain and hearing loss. Eyes: Negative. Negative for pain, discharge and visual disturbance. Respiratory: Negative. Negative for apnea, chest tightness and shortness of breath. Cardiovascular: Negative. Negative for chest pain, palpitations and leg swelling. Gastrointestinal: Negative. Negative for abdominal pain, diarrhea and vomiting. Endocrine: Negative for cold intolerance, heat intolerance and polydipsia. Genitourinary: Negative. Negative for dysuria, flank pain and hematuria. Musculoskeletal: Negative. Negative for arthralgias, gait problem and neck pain. Skin: Negative. Negative for color change, pallor and rash. Allergic/Immunologic: Negative for environmental allergies, food allergies and immunocompromised state. Neurological: Negative. Negative for dizziness, numbness and headaches. Hematological: Negative for adenopathy. Psychiatric/Behavioral: Negative. Negative for behavioral problems and hallucinations.    All other systems reviewed and are

## 2022-09-13 RX ORDER — CLOPIDOGREL BISULFATE 75 MG/1
75 TABLET ORAL DAILY
Qty: 30 TABLET | Refills: 3 | Status: SHIPPED
Start: 2022-09-13 | End: 2022-09-19 | Stop reason: SDUPTHER

## 2023-01-05 ENCOUNTER — APPOINTMENT (OUTPATIENT)
Dept: CT IMAGING | Age: 88
End: 2023-01-05
Payer: MEDICARE

## 2023-01-05 ENCOUNTER — HOSPITAL ENCOUNTER (EMERGENCY)
Age: 88
Discharge: LEFT BEFORE DISCHARGE | End: 2023-01-05
Attending: EMERGENCY MEDICINE
Payer: MEDICARE

## 2023-01-05 VITALS
BODY MASS INDEX: 26.53 KG/M2 | HEART RATE: 98 BPM | DIASTOLIC BLOOD PRESSURE: 88 MMHG | SYSTOLIC BLOOD PRESSURE: 140 MMHG | HEIGHT: 67 IN | OXYGEN SATURATION: 98 % | TEMPERATURE: 98.3 F | RESPIRATION RATE: 16 BRPM | WEIGHT: 169 LBS

## 2023-01-05 DIAGNOSIS — Z86.73 OLD LACUNAR STROKE WITHOUT LATE EFFECT: ICD-10-CM

## 2023-01-05 DIAGNOSIS — S20.229A CONTUSION OF BACK WALL OF THORAX, INITIAL ENCOUNTER: ICD-10-CM
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
## 2023-01-05 DIAGNOSIS — S22.42XA CLOSED FRACTURE OF MULTIPLE RIBS OF LEFT SIDE, INITIAL ENCOUNTER: Primary | ICD-10-CM

## 2023-01-05 DIAGNOSIS — J94.2 HEMOTHORAX ON LEFT: ICD-10-CM

## 2023-01-05 LAB
ALBUMIN SERPL-MCNC: 3.9 G/DL (ref 3.5–5.2)
ALP BLD-CCNC: 87 U/L (ref 40–129)
ALT SERPL-CCNC: 36 U/L (ref 0–40)
ANION GAP SERPL CALCULATED.3IONS-SCNC: 13 MMOL/L (ref 7–16)
AST SERPL-CCNC: 33 U/L (ref 0–39)
BASOPHILS ABSOLUTE: 0.06 E9/L (ref 0–0.2)
BASOPHILS RELATIVE PERCENT: 0.6 % (ref 0–2)
BILIRUB SERPL-MCNC: 0.7 MG/DL (ref 0–1.2)
BUN BLDV-MCNC: 20 MG/DL (ref 6–23)
CALCIUM SERPL-MCNC: 9.6 MG/DL (ref 8.6–10.2)
CHLORIDE BLD-SCNC: 110 MMOL/L (ref 98–107)
CO2: 19 MMOL/L (ref 22–29)
CREAT SERPL-MCNC: 1.7 MG/DL (ref 0.7–1.2)
EOSINOPHILS ABSOLUTE: 0.09 E9/L (ref 0.05–0.5)
EOSINOPHILS RELATIVE PERCENT: 0.9 % (ref 0–6)
GFR SERPL CREATININE-BSD FRML MDRD: 38 ML/MIN/1.73
GLUCOSE BLD-MCNC: 153 MG/DL (ref 74–99)
HCT VFR BLD CALC: 45.6 % (ref 37–54)
HEMOGLOBIN: 15.4 G/DL (ref 12.5–16.5)
IMMATURE GRANULOCYTES #: 0.05 E9/L
IMMATURE GRANULOCYTES %: 0.5 % (ref 0–5)
LYMPHOCYTES ABSOLUTE: 2.18 E9/L (ref 1.5–4)
LYMPHOCYTES RELATIVE PERCENT: 22.4 % (ref 20–42)
MCH RBC QN AUTO: 30.6 PG (ref 26–35)
MCHC RBC AUTO-ENTMCNC: 33.8 % (ref 32–34.5)
MCV RBC AUTO: 90.7 FL (ref 80–99.9)
MONOCYTES ABSOLUTE: 0.73 E9/L (ref 0.1–0.95)
MONOCYTES RELATIVE PERCENT: 7.5 % (ref 2–12)
NEUTROPHILS ABSOLUTE: 6.62 E9/L (ref 1.8–7.3)
NEUTROPHILS RELATIVE PERCENT: 68.1 % (ref 43–80)
PDW BLD-RTO: 13.1 FL (ref 11.5–15)
PLATELET # BLD: 166 E9/L (ref 130–450)
PMV BLD AUTO: 10.5 FL (ref 7–12)
POTASSIUM SERPL-SCNC: 3.8 MMOL/L (ref 3.5–5)
RBC # BLD: 5.03 E12/L (ref 3.8–5.8)
SODIUM BLD-SCNC: 142 MMOL/L (ref 132–146)
TOTAL PROTEIN: 7.5 G/DL (ref 6.4–8.3)
WBC # BLD: 9.7 E9/L (ref 4.5–11.5)

## 2023-01-05 PROCEDURE — 80053 COMPREHEN METABOLIC PANEL: CPT

## 2023-01-05 PROCEDURE — 85025 COMPLETE CBC W/AUTO DIFF WBC: CPT

## 2023-01-05 PROCEDURE — 99284 EMERGENCY DEPT VISIT MOD MDM: CPT

## 2023-01-05 PROCEDURE — 71250 CT THORAX DX C-: CPT

## 2023-01-05 PROCEDURE — 72125 CT NECK SPINE W/O DYE: CPT

## 2023-01-05 PROCEDURE — 70450 CT HEAD/BRAIN W/O DYE: CPT

## 2023-01-05 PROCEDURE — 72131 CT LUMBAR SPINE W/O DYE: CPT

## 2023-01-05 PROCEDURE — 72128 CT CHEST SPINE W/O DYE: CPT

## 2023-01-05 RX ORDER — LIDOCAINE 50 MG/G
1 PATCH TOPICAL DAILY
Qty: 10 PATCH | Refills: 0 | Status: SHIPPED | OUTPATIENT
Start: 2023-01-05 | End: 2023-01-15

## 2023-01-05 RX ORDER — HYDROCODONE BITARTRATE AND ACETAMINOPHEN 5; 325 MG/1; MG/1
1 TABLET ORAL EVERY 6 HOURS PRN
Qty: 10 TABLET | Refills: 0 | Status: SHIPPED | OUTPATIENT
Start: 2023-01-05 | End: 2023-01-08

## 2023-01-05 ASSESSMENT — PAIN DESCRIPTION - FREQUENCY: FREQUENCY: CONTINUOUS

## 2023-01-05 ASSESSMENT — PAIN DESCRIPTION - LOCATION: LOCATION: BACK

## 2023-01-05 ASSESSMENT — PAIN DESCRIPTION - PAIN TYPE: TYPE: ACUTE PAIN

## 2023-01-05 ASSESSMENT — PAIN DESCRIPTION - ORIENTATION: ORIENTATION: LEFT

## 2023-01-05 NOTE — DISCHARGE INSTRUCTIONS
You were found to have a fracture of your left back rib #8, 9 and 10 with a small amount of blood in your lung. You were asked to be admitted to the trauma center [facility]. You are deciding to sign out AGAINST MEDICAL ADVICE. You were educated on the risks of doing so. You were given an incentive spirometer please use this at least 5 times a day at each sitting 5 times. You are at risk of pneumonia. If at any point you develop fever, chills, shortness of breath, worsening pain please immediately go to the [facility] emergency center. You were given the name of the trauma clinic please call and follow-up with them due to the fact that you have multiple rib fractures this needs to be monitored closely. You were sent lidocaine patches you can place this over the area for 10 hours on 10 hours off. Once you have used one of the patches rip it off and take a break for about 10 hours and then you can apply another for pain if needed. You were also given pain medication this medication causes sleepiness. It is a narcotic. This also causes nausea as well as severe constipation so only take it if the pain is very bad.   If at any point you feel worse please always return back to the emergency department we are here to take care of you

## 2023-01-05 NOTE — ED PROVIDER NOTES
Shared RAJAN-ED Attending Visit. CC: No       [facility]  Department of Emergency Medicine   ED  Encounter Note  Admit Date/RoomTime: 2023  9:23 AM  ED Room: CITLALI/CITLALI    NAME: Yesi Aldrich  : 1934  MRN: [id number]     Chief Complaint:  Fall (Left sided back pain; mechanical fall( in bathtub), did not hit head or loc, no thinners)    History of Present Illness       Yesi Aldrich is a 80 y.o. old male who presents to the emergency department by private vehicle, for a mechanical fall which occured 4 day(s) prior to arrival. He reportedly was drying off after his shower with one foot propped up on the toilet when he lost his balance and fell backwards into the bathtub while at home. Patient reports pain located in the left upper back that does not radiate. He describes the pain as a severe stabbing that comes and goes. Patient states, \"I think that I broke some ribs. \" The patients tetanus status is within past 5 years. Patient went to urgent care on Tuesday following the fall where he received a tetanus shot and was given antibiotic cream and a bandage for his back. Since onset the symptoms have been worsening. His pain is aggraveated by coughing, blowing his nose, and walking, and relieved by OTC NSAIDS with mild relief. He denies any head injury, headache, loss of consciousness, or dizziness. He takes Plavix. ROS   Pertinent positives and negatives are stated within HPI, all other systems reviewed and are negative. Past Medical History:  has a past medical history of Diabetes mellitus ([address] Dizziness, and Thyroid disease. Surgical History:  has a past surgical history that includes Cholecystectomy; Cataract removal; Prostate surgery; and Colonoscopy. Social History:  reports that he has never smoked. He has never used smokeless tobacco. He reports that he does not drink alcohol and does not use drugs.     Family History: family history includes Diabetes in his mother. Allergies: Patient has no known allergies. Physical Exam   Oxygen Saturation Interpretation: Normal.        ED Triage Vitals   BP Temp Temp Source Heart Rate Resp SpO2 Height Weight   01/05/23 0921 01/05/23 0921 01/05/23 0921 01/05/23 0855 01/05/23 0921 01/05/23 0855 01/05/23 0921 01/05/23 0921   (!) 137/91 98.3 °F (36.8 °C) Oral (!) 102 14 94 % 5' 7\" (1.702 m) 169 lb (76.7 kg)         Physical Exam  Constitutional:  Alert, development consistent with age. HEENT:  NC/NT. Airway patent. Neck:  No midline or paravertebral tenderness. Normal ROM. Supple. Chest:  Symmetrical.  It is elicited over the left posterior chest wall over the thorax. Ecchymosis noted. No evidence of crepitus. No step-off deformity. Respiratory:  Lungs Clear to auscultation and breath sounds equal. Deep breaths elicited moderate pain in the left upper posterior thorax. CV:  Regular rate and rhythm, normal heart sounds, without pathological murmurs, ectopy, gallops, or rubs. GI:  Abdomen Soft, nontender, good bowel sounds. No firm or pulsatile mass. Pelvis:  Stable, nontender to palpation. Back:  No midline or paravertebral tenderness. No costovertebral tenderness. Ecchymosis noted in the left upper posterior thorax with mild swelling. Extremities: No tenderness or edema noted. Upper extremity ROM elicited mild pain in the left upper posterior thorax. Integument:  Normal turgor. Warm, dry, without visible rash, unless noted elsewhere. Large amount of ecchymosis and swelling noted to the left posterior thorax region around ribs 7 through 10. Lymphatic: no lymphadenopathy noted  Neurological:  Oriented x3, GCS 15. Motor functions intact. Lab / Imaging Results   (All laboratory and radiology results have been personally reviewed by myself)  Labs:  No results found for this visit on 01/05/23. Imaging: All Radiology results interpreted by Radiologist unless otherwise noted.   CT HEAD WO CONTRAST   Final Result   1. There is no acute intracranial abnormality. Specifically, there is no   intracranial hemorrhage. 2. Atrophy and periventricular leukomalacia,   3. Old infarcts within the right and left basal ganglia. .         CT CERVICAL SPINE WO CONTRAST   Final Result   1. There is no acute compression fracture or subluxation of the cervical   spine. 2. Multilevel degenerative disc and degenerative joint disease. CT THORACIC SPINE WO CONTRAST   Final Result   1. Acute mildly displaced fractures of the left medial 8, 9th and 10th ribs   with small left hemothorax. 2.  No fractures involving the thoracic spine. CT LUMBAR SPINE WO CONTRAST   Final Result   1. No acute fracture or subluxation. 2. Diffuse degenerative changes throughout the lumbar spine with possible   central canal stenosis at T12-L1 and multilevel neural foraminal narrowing. 3. Incomplete visualization of chronic calcific pancreatitis. There is   possible diffuse pancreatic duct dilatation or diffuse cystic lesions within   the pancreas. CT CHEST WO CONTRAST   Final Result   1. Acute mildly displaced fractures of the posteromedial 8 through 10th ribs   on the left side. There is a small associated left hemothorax. No   pneumothorax. 2.  9 mm density in the lingular segment of the left lung, consider follow-up   noncontrast CT chest in 3 months, PET-CT or soft tissue sampling. 3.  Lobular low-density within the pancreas which appears to represent   chronically dilated pancreatic duct with pancreatic parenchymal atrophy. Multiple calcifications are present. Findings may reflect chronic   pancreatitis. Neoplasm difficult to exclude, no focal mass identified. ED Course / Medical Decision Making   Medications - No data to display     Re-examination:  1/5/23     Time: 9:30  Patients symptoms show no change.     1130 patient was seen and evaluated by my attending and patient was educated due to the need to go to a trauma center due to 3 fractured ribs and a hemothorax. Patient states he would like to \"think about this. \"  Patient states that he has a dinner party tonight. Patient states that it happened 4 days ago. Patient will decide in the next few minutes if he like to be transferred    Consult(s):   None    Procedure(s):  None    MDM:   Patient is an 80 y.o. male who presents to the emergency department for a mechanical fall which occurred 4 days prior to arrival. He describes pain in his left posterior thorax that is exacerbated by coughing. Patient denies hitting his head during the fall or losing consciousness. Physical exam revealed an ecchymosis on the left posterior thorax with mild swelling that was non-tender upon palpation. Due to the mechanism of injury, history, and exam findings, a CT scan without contrast of the head, cervical spine, thoracic spine, lumbar spine, and chest was ordered. CT scan of the thoracic spine revealed fractures of the left 8th through 10th rib medially with a small left hemothorax. CT scan of the chest revealed a small left hemothorax. On head CT patient was found to have an old lacunar infarct which is unchanged. All of these results were gone over in detail with the patient as well as my attending who was staffed to the case. Due to the fact that this is 3 or more ribs patient will need to be transferred to the trauma center. Patient was advised of this and as well as the hemothorax. Patient states that he declines being admitted. Patient would like to sign out [address] Patient was educated by myself as well as Dr. Barrett Gil multiple times the risk of pneumonia, worsening lung function or acute death can occur from signing out [address] Patient will be given an incentive spirometer and was educated how to use it. Repeat vitals were done as well as basic lab work was drawn.   Patient was educated if he develops any worsening shortness of breath, chest pain, fever, chills, worsening pain to please return back to the emergency department at any time. Information for the trauma center was given to the patient as well as the trauma clinic. Patient was advised to follow closely with his family doctor if he does not decide to return back to the emergency department. AMA paperwork was signed. Patient was also given lidocaine patches as well as some pain pills. Patient was advised of the risk, benefit and side effects of the medication. Patient was told he cannot drive on this medication. Patient was educated that it causes nausea and constipation. Patient was only told to take it if needed and the pain is very bad. Patient was also educated with the lidocaine patches. Patient was educated this 10 hours on 10 hours off. Patient voiced understanding and agreed to use it sparingly. Patient still would like to sign out [address] after all the education has been given. At this time patient will sign out [address]    1/5/23 / Time:   12:39 PM EST. This patient has chosen to leave against medical advice. I have personally explained to them that choosing to do so may result in permanent bodily harm or death. I discussed at length that without further evaluation and monitoring there may be unforeseen circumstances and deterioration resulting in permanent bodily harm or death as a result of their choice. They are alert, oriented, and competent at this time. They state that they are aware of the serious risks as explained, but they continue to wish to leave against medical advice. In light of their decision to leave against medical advice, follow-up has been arranged and they are aware of the importance of following up as instructed. They have been advised that they should return to the ED immediately if they change their mind at any time, or if their condition begins to change or worsen. ------------------------------------------------------------------------------------------      Plan of Care/Counseling:  Chani Zavala PA-C reviewed today's visit with the patient in addition to providing specific details for the plan of care and counseling regarding the diagnosis and prognosis. Questions are answered at this time and are agreeable with the plan. Assessment      1. Closed fracture of multiple ribs of left side, initial encounter    2. Old lacunar stroke without late effect    3. Fall, initial encounter    4. Contusion of back wall of thorax, initial encounter    5. Hemothorax on left      Plan   AMA  Patient condition is fair    New Medications     Discharge Medication List as of 1/5/2023 12:15 PM        START taking these medications    Details   HYDROcodone-acetaminophen (NORCO) 5-325 MG per tablet Take 1 tablet by mouth every 6 hours as needed for Pain for up to 3 days. Intended supply: 3 days. Take lowest dose possible to manage pain Max Daily Amount: 4 tablets, Disp-10 tablet, R-0Normal      lidocaine (LIDODERM) 5 % Place 1 patch onto the skin daily for 10 days 12 hours on, 12 hours off., Disp-10 patch, R-0Normal           Electronically signed by Chani Zavala PA-C   DD: 1/5/23  **This report was transcribed using voice recognition software. Every effort was made to ensure accuracy; however, inadvertent computerized transcription errors may be present.   END OF ED PROVIDER NOTE       Chani Zavala PA-C  01/05/23 3275

## 2023-10-30 NOTE — PROGRESS NOTES
Date of Exam  10/30/2023    YOB: 1966     CHIEF COMPLAINT:   Jasen presents today for evaluation of multiple joint pain, abnormal labs    REFERRED BY:   Raad Diamond PA-C    HISTORY OF PRESENT ILLNESS:   Patient is a 57 year old male that presents for evaluation of pain in his joints. Reports he has pain in his hands, shoulders, feet, ankles, knees. Reports he has a trigger finger. Sometimes he has to run his hands under warm water to help get them moving. Reports the pain is sometimes aching, sometimes burning and sometimes numb. Reports pain is in the morning and at night, reports his pain is better and he feels good when it is warm out. Reports if he is sitting for a while his legs get restless and he needs to move. Patient reports he takes lyrica and this helps him to sleep, also takes cyclobenzaprine. Reports he was on 75 Mg of lyrica but had his doctor reduce the dose 25 mg daily at bedtime. Reports his mom has rheumatoid arthritis and osteoarthritis and his sister had rheumatoid arthritis.  Jasen  was referred to my clinic for further evaluation and treatment. After starting carvedilol for his BP started getting constipation. Started on linzess from PCP. Patient had a fall October 21st on the outside ground and hurt his right elbow, wrist, knee. Patient reports he is a  and has been gaining weight for over a year due to medicine.    Overall, Jasen  rates the pain at 9-10 out of 10. Currently he does some stretching, went to therapy a year ago and does some of these exercises.     Patient denies any recent ocular, skin, or GI complaints. Patient denies any other systemic rheumatic complaints.   There is no history of rash, oral/nasal ulcers. There is no recent history of tick/insect bites, sick contacts, or foreign travel. The medical review of system is negative for headaches, visual changes, chest pain, difficulty breathing, nausea, vomiting, fever, chills, or changes in  Telephone report received from ED. CMR notified of new admit. Vazquez Rosario RN bowel or urinary habits.     REVIEW OF SYSTEMS:   As per the History of Present Illness.     PAST MEDICAL AND SURGICAL HISTORY: .  No past medical history on file.  No past surgical history on file.    MEDICATIONS:   Current Outpatient Medications   Medication Sig   • cetirizine (ZyrTEC) 10 MG tablet Take 10 mg by mouth daily.   • fluticasone (FLONASE) 50 MCG/ACT nasal spray Spray 2 sprays in each nostril daily. SHAKE LIQUID   • pregabalin (LYRICA) 25 MG capsule Take 25 mg by mouth daily.   • bacitracin-polymyxin B (POLYSPORIN) ointment Apply topically 2 times daily.   • cyclobenzaprine (FLEXERIL) 10 MG tablet    • losartan (COZAAR) 25 MG tablet Take 25 mg by mouth daily. TAKE 1/2 TABLET BY MOUTH DAILY   • pregabalin (LYRICA) 75 MG capsule Take 75 mg by mouth daily. TAKE 1 CAPSULE BY MOUTH EVERY DAY   • carvedilol (COREG) 6.25 MG tablet Take 6.25 mg by mouth 2 times daily.   • chlorthalidone (THALITONE) 25 MG tablet Take 25 mg by mouth daily.   • gabapentin (NEURONTIN) 300 MG capsule Take 300 mg by mouth at bedtime.   • Linzess 290 MCG Take 290 mcg by mouth daily.   • rosuvastatin (CRESTOR) 20 MG tablet Take 20 mg by mouth daily.     No current facility-administered medications for this visit.        ALLERGIES:   ALLERGIES:   Allergen Reactions   • Penicillin G        FAMILY HISTORY:   No family history on file.       SOCIAL HISTORY:   Social History     Socioeconomic History   • Marital status: Single   Tobacco Use   • Smoking status: Never   • Smokeless tobacco: Never   Substance and Sexual Activity   • Alcohol use: No   • Drug use: No       .  PHYSICAL EXAMINATION:   Vitals:    10/30/23 1326   BP: 133/78   BP Location: RUE - Right upper extremity   Cuff Size: Large Adult   Pulse: 88   Temp: 98.3 °F (36.8 °C)   TempSrc: Temporal   Weight: 101.9 kg (224 lb 9.6 oz)   Height: 5' 8\" (1.727 m)     GENERAL: Well dressed and well developed.   HEENT: Normocephalic, atraumatic. Normal appearing sclerae and    conjunctivae.  NECK: Supple and nontender. No cervical or supraclavicular lymphadenopathy.   CARDIOVASCULAR: Regular rate and rhythm. Normal S1, S2. LUNGS: Clear to auscultation. No rales or wheezing. Breathing is unlabored.   ABDOMEN: Soft, nontender, and nondistended. No rebound, rigidity, or guarding. Positive bowel sounds. No hepatosplenomegaly.   EXTREMITIES: No edema, cyanosis, or clubbing.   SKIN: No rashes, digital ulcers, periungual erythema, nail pitting, nodules, or sclerodactyly.   MUSCULOSKELETAL:  Full range of motion of the neck; full range of motion of the bilateral shoulders, bilateral elbows, and bilateral wrists. Tenderness to bilateral elbows, bilateral wrists, right worse than left. Tenderness to right first and third MCP, PIP.  No nodules, synovitis, subcutaneous nodules, or nail changes.  Full range of motion of the bilateral hips, bilateral knees, and bilateral ankles. Tenderness bilateral greater trochanter, bilateral knees, right worse than left.There are no signs of synovitis, effusions, or Baker's cyst.   SPINE: No tenderness, normal range of motion.   NEUROLOGICAL: Sensation intact. Cranial nerves 2-12 intact.   PSYCHOLOGICAL: Alert and oriented x3. Mood and affect are normal.     LABORATORY AND IMAGING DATA:   No visits with results within 3 Month(s) from this visit.   Latest known visit with results is:   [facility] on 05/31/2022   Component Date Value Ref Range Status   • Interventricular Septum in End Lucia* 05/31/2022 1.132  cm Final   • Left Ventricular Internal Dimensio* 05/31/2022 5.068  cm Final   • LV end diastolic posterior wall th* 05/31/2022 1.1  cm Final   • Left Internal Dimenson in Systole 05/31/2022 3.184  cm Final   • LV outflow tract 05/31/2022 2.134  cm Final   • Ascending Aorta 05/31/2022 3.829  cm Final   • LVOT VTI 05/31/2022 32.1  cm Final   • MV Peak E Velocity 05/31/2022 0.5  m/s Final   • MV Peak A Velocity 05/31/2022 0.8  m/s Final   • E Wave  Decelaration Time 05/31/2022 0.338  s Final   • MV E Tissue Efren Med 05/31/2022 0.0  m/s Final   • MV E Tissue Efren Lat 05/31/2022 0.0  m/s Final   • DOP Calc LVOT Peak Efren 05/31/2022 1.6  m/s Final   • AV Peak Velocity 05/31/2022 1.9  m/s Final   • Tricuspid valve annular peak veloc* 05/31/2022 0.1  m/s Final   • Tricuspid Annular Plane Systolic E* 05/31/2022 1.699  cm Final   • TV Estimated Right Arterial Pressu* 05/31/2022 3  mmHg Final   • LV End Systolic Longitudinal Strai* 05/31/2022 -16.5  % Final   • MV E Wave Efren/E Tissue Efren Med 05/31/2022 10.366  unitless Final   • Aortic Valve Area 05/31/2022 3.362  cmÂ² Final   • Ejection Fraction 05/31/2022 55  % Final   • AV Mean Gradient 05/31/2022 7.979  mmHg Final   • AV Peak Gradient 05/31/2022 14.94  mmHg Final   • AKIN LVOT Peak Gradient 05/31/2022 5.861  mmHg Final       Orders Placed This Encounter   • XR HAND 3 OR MORE VIEWS BILATERAL     Scheduling Instructions:      Instructions for required prep will be provided at time of scheduling imaging exam.                  Order Specific Question:   How should test results be released to the patient's MyChart portal?     Answer:   Automatic Release   • XR WRIST 2 VIEW BILATERAL     Scheduling Instructions:      Instructions for required prep will be provided at time of scheduling imaging exam.                  Order Specific Question:   How should test results be released to the patient's MyChart portal?     Answer:   Automatic Release [[id number]]   • XR SACROILIAC JOINTS 3 OR MORE VIEWS     Scheduling Instructions:      Instructions for required prep will be provided at time of scheduling imaging exam.                  Order Specific Question:   How should test results be released to the patient's MyChart portal?     Answer:   Automatic Release   • CBC with Automated Differential   • C Reactive Protein   • Creatinine   • Hepatic Function Panel   • Sedimentation Rate   • Rheumatoid Factor IgA, IgG, IgM   • Cyclic  Citrullinated Peptide Antibody IgG & IgA   • ETA PROTEIN, 14-3-3   • HLA-B27        ASSESSMENT/PLAN:   Polyarthralgia  Patient presents with complaint of polyarthralgia. Reports he did discuss with his PCP a year ago and they did lab work. Labs faxed over from [address] [place] [address] and showed normal inflammatory markers, negative AUNDREA, RF 22. Patient reports he had a fall last week and hurt his right elbow, wrist and knee. Reports trigger finger of right fourth digit. On exam, mild tenderness to left glenohumeral and acromioclavicular joints, mild tenderness to right first and third MCP, bilateral wrists. No synovitis on exam. Continues lyrica 25 mg at bedtime and cyclobenzaprine prescribed by his  PCP. Discussed various options including the option to do nothing at all. I will obtain CBC, creatinine, liver panel, ESR, CRP, 14.3.3 ETA, Rheumatoid factor panel, CCP, Quantiferon TB, hepatitis chronic panel, bilateral hand and wrist x-rays as well as x-rays of the SI joints. I will give a medrol dose pack today and patient will update on any improvement once he completes it.          I reviewed the side effects of all medications prescribed by me as listed in the physician's desk reference and in the package inserts.  Other reasonable and generally accepted treatment options, including the option to do nothing at all, were discussed. The patient was instructed by me to contact our office if the symptoms have not improved, worsened, or if there are any issues/concerns.  The supervising physician for this visit is Dr. Chon Fonseca.  The patient will return to clinic in [place] in about 3 months (around 1/30/2024).

## 2024-01-29 PROBLEM — R42 VERTIGO: Status: ACTIVE | Noted: 2024-01-29

## 2025-09-05 ENCOUNTER — HOSPITAL ENCOUNTER (OUTPATIENT)
Dept: PREADMISSION TESTING | Age: 89
Discharge: HOME | End: 2025-09-05
Payer: MEDICARE

## 2025-09-05 LAB
EKG ATRIAL RATE: 69 BPM
EKG P AXIS: 33 DEGREES
EKG P-R INTERVAL: 196 MS
EKG Q-T INTERVAL: 424 MS
EKG QRS DURATION: 100 MS
EKG QTC CALCULATION (BAZETT): 454 MS
EKG R AXIS: -54 DEGREES
EKG T AXIS: 3 DEGREES
EKG VENTRICULAR RATE: 69 BPM

## 2025-09-05 PROCEDURE — 93010 ELECTROCARDIOGRAM REPORT: CPT | Performed by: INTERNAL MEDICINE

## 2025-09-05 PROCEDURE — 87081 CULTURE SCREEN ONLY: CPT

## 2025-09-05 PROCEDURE — 93005 ELECTROCARDIOGRAM TRACING: CPT | Performed by: ANESTHESIOLOGY

## 2025-09-07 LAB
MICROORGANISM SPEC CULT: NORMAL
SPECIMEN DESCRIPTION: NORMAL